# Patient Record
Sex: FEMALE | Race: AMERICAN INDIAN OR ALASKA NATIVE | NOT HISPANIC OR LATINO | Employment: FULL TIME | URBAN - METROPOLITAN AREA
[De-identification: names, ages, dates, MRNs, and addresses within clinical notes are randomized per-mention and may not be internally consistent; named-entity substitution may affect disease eponyms.]

---

## 2017-03-16 ENCOUNTER — OFFICE VISIT (OUTPATIENT)
Dept: FAMILY MEDICINE | Facility: CLINIC | Age: 22
End: 2017-03-16
Payer: COMMERCIAL

## 2017-03-16 VITALS
OXYGEN SATURATION: 99 % | DIASTOLIC BLOOD PRESSURE: 78 MMHG | HEIGHT: 66 IN | TEMPERATURE: 98.2 F | SYSTOLIC BLOOD PRESSURE: 126 MMHG | WEIGHT: 270.3 LBS | HEART RATE: 76 BPM | BODY MASS INDEX: 43.44 KG/M2

## 2017-03-16 DIAGNOSIS — Z11.3 SCREEN FOR STD (SEXUALLY TRANSMITTED DISEASE): ICD-10-CM

## 2017-03-16 DIAGNOSIS — Z30.011 INITIATION OF OCP (BCP): ICD-10-CM

## 2017-03-16 DIAGNOSIS — G93.0 CYST OF BRAIN: Primary | ICD-10-CM

## 2017-03-16 DIAGNOSIS — Z71.85 HPV VACCINE COUNSELING: ICD-10-CM

## 2017-03-16 DIAGNOSIS — S16.1XXA CERVICAL STRAIN, INITIAL ENCOUNTER: ICD-10-CM

## 2017-03-16 PROCEDURE — 87491 CHLMYD TRACH DNA AMP PROBE: CPT | Performed by: FAMILY MEDICINE

## 2017-03-16 PROCEDURE — 87591 N.GONORRHOEAE DNA AMP PROB: CPT | Performed by: FAMILY MEDICINE

## 2017-03-16 PROCEDURE — 99214 OFFICE O/P EST MOD 30 MIN: CPT | Mod: 25 | Performed by: FAMILY MEDICINE

## 2017-03-16 PROCEDURE — 90471 IMMUNIZATION ADMIN: CPT | Performed by: FAMILY MEDICINE

## 2017-03-16 PROCEDURE — 80053 COMPREHEN METABOLIC PANEL: CPT | Performed by: FAMILY MEDICINE

## 2017-03-16 PROCEDURE — 84443 ASSAY THYROID STIM HORMONE: CPT | Performed by: FAMILY MEDICINE

## 2017-03-16 PROCEDURE — 36415 COLL VENOUS BLD VENIPUNCTURE: CPT | Performed by: FAMILY MEDICINE

## 2017-03-16 PROCEDURE — 90649 4VHPV VACCINE 3 DOSE IM: CPT | Performed by: FAMILY MEDICINE

## 2017-03-16 RX ORDER — DESOGESTREL AND ETHINYL ESTRADIOL 0.15-0.03
1 KIT ORAL DAILY
Qty: 84 TABLET | Refills: 3 | Status: SHIPPED | OUTPATIENT
Start: 2017-03-16 | End: 2017-06-08

## 2017-03-16 NOTE — PROGRESS NOTES
"  SUBJECTIVE:                                                    Danielle Do is a 22 year old female who presents to clinic today for the following health issues:      Patient 5 years ago had a MRI done because they found a cyst in her brain at [facility]. They told her to have a follow-up MRI in 5 years. Has been experiencing more frequent headaches. Would also like to start birth control, was on pills before but want to explore options.     Encounter Diagnoses   Name Primary?     Cyst of brain Yes     Initiation of OCP (BCP), wants to go back on OCP      HPV vaccine      Screen for STD (sexually transmitted disease),one partner, usually uses condoms         Problem list and histories reviewed & adjusted, as indicated.  Additional history: as documented    Labs reviewed in EPIC    Reviewed and updated as needed this visit by clinical staff  Tobacco  Allergies  Meds  Med Hx  Surg Hx  Fam Hx  Soc Hx      Reviewed and updated as needed this visit by Provider         ROS:  Constitutional, HEENT, cardiovascular, pulmonary, gi and gu systems are negative, except as otherwise noted.    OBJECTIVE:                                                    /78  Pulse 76  Temp 98.2  F (36.8  C) (Oral)  Ht 5' 6\" (1.676 m)  Wt 270 lb 4.8 oz (122.6 kg)  LMP 03/03/2017 (Exact Date)  SpO2 99%  BMI 43.63 kg/m2  Body mass index is 43.63 kg/(m^2).  GENERAL: healthy, alert and no distress  HENT: ear canals and TM's normal, nose and mouth without ulcers or lesions  NECK: no adenopathy, no asymmetry, masses, or scars and thyroid normal to palpation    tenderness over lower cervical spine and nuchal area, tenderness over trapezial muscles, normal neurological exam of arms; normal DTR's, motor, sensory exam.    RESP: lungs clear to auscultation - no rales, rhonchi or wheezes  CV: regular rate and rhythm, normal S1 S2, no S3 or S4, no murmur, click or rub, no peripheral edema and peripheral pulses strong  ABDOMEN: soft, " nontender, no hepatosplenomegaly, no masses and bowel sounds normal  PSYCH: mentation appears normal, affect normal/bright  LYMPH: no cervical, supraclavicular, axillary, or inguinal adenopathy         ASSESSMENT/PLAN:                                                            1. Cyst of brain  We were unable to assess PK care everywhere, she will resign for records  - MR Brain w/o & w Contrast; Future    2. Initiation of OCP (BCP)  OK The use of the oral contraceptive has been fully discussed with the patient. This includes the proper method to initiate (i.e. Sunday start after next normal menstrual onset) and continue the pills, the need for regular compliance to ensure adequate contraceptive effect, and warnings about anticipated minor side effects such as breakthrough spotting, nausea, breast tenderness, weight changes, acne, headaches, etc.  The patient has been told of the more serious potential side effects such as MI, stroke, and deep vein thrombosis, all of which are very unlikely.  She has been asked to report any signs of such serious problems immediately.  She should back up the pill with a condom during any cycle in which antibiotics are prescribed, and during the first two weeks of the first cycle as well.  The need for additional protection, such as a condom, to prevent exposure to sexually transmitted diseases has also been discussed- the patient has been clearly reminded that OCP's cannot protect her against diseases such as HIV and others. She understands and wishes to take the medication as prescribed.  - Comprehensive metabolic panel (BMP + Alb, Alk Phos, ALT, AST, Total. Bili, TP)  - TSH with free T4 reflexFollow up in 1 month.- desogestrel-ethinyl estradiol (APRI) 0.15-30 MG-MCG per tablet; Take 1 tablet by mouth daily  Dispense: 84 tablet; Refill: 3  Follow up in 1 month.   3. Screen for STD (sexually transmitted disease)  Urged safer sex  - NEISSERIA GONORRHOEA PCR  - CHLAMYDIA TRACHOMATIS  PCR    4. Cervical strain, initial encounter  Try ice    Consider Physical Therapy and XRay studies if not improving.   Call or return to clinic prn if these symptoms worsen or fail to improve as anticipated.    5. HPV vaccine counseling  done  - HUMAN PAPILLOMAVIRUS VACCINE  Regular exercise  Follow up in 1 month.   See Patient Instructions    Cruzito Edouard MD  [facility] [place]

## 2017-03-16 NOTE — PROGRESS NOTES
"Chief Complaint   Patient presents with     Referral     for MRI     Contraception       Initial /78  Pulse 76  Temp 98.2  F (36.8  C) (Oral)  Ht 5' 6\" (1.676 m)  Wt 270 lb 4.8 oz (122.6 kg)  LMP 03/03/2017 (Exact Date)  SpO2 99%  BMI 43.63 kg/m2 Estimated body mass index is 43.63 kg/(m^2) as calculated from the following:    Height as of this encounter: 5' 6\" (1.676 m).    Weight as of this encounter: 270 lb 4.8 oz (122.6 kg).  Medication Reconciliation: complete     Cher Bacon MA        "

## 2017-03-16 NOTE — MR AVS SNAPSHOT
"              After Visit Summary   3/16/2017    Danielle Do    MRN: [id number]           Patient Information     Date Of Birth          1995        Visit Information        Provider Department      3/16/2017 11:00 AM Cruzito Edouard MD [facility] [place]        Today's Diagnoses     Cyst of brain    -  1    Initiation of OCP (BCP)           Follow-ups after your visit        Future tests that were ordered for you today     Open Future Orders        Priority Expected Expires Ordered    MR Brain w/o & w Contrast Routine  3/16/2018 3/16/2017            Who to contact     If you have questions or need follow up information about today's clinic visit or your schedule please contact [facility] directly at [phone].  Normal or non-critical lab and imaging results will be communicated to you by MyChart, letter or phone within 4 business days after the clinic has received the results. If you do not hear from us within 7 days, please contact the clinic through Reunifyhart or phone. If you have a critical or abnormal lab result, we will notify you by phone as soon as possible.  Submit refill requests through ip.access or call your pharmacy and they will forward the refill request to us. Please allow 3 business days for your refill to be completed.          Additional Information About Your Visit        Reunifyhart Information     [email or web address] lets you send messages to your doctor, view your test results, renew your prescriptions, schedule appointments and more. To sign up, go to [email or web address] . Click on \"Log in\" on the left side of the screen, which will take you to the Welcome page. Then click on \"Sign up Now\" on the right side of the page.     You will be asked to enter the access code listed below, as well as some personal information. Please follow the directions to create your username and password.     Your access code is: [email or web address]  Expires: 6/14/2017 11:50 AM     Your " "access code will  in 90 days. If you need help or a new code, please call your [facility] clinic or [phone].        Care EveryWhere ID     This is your Care EveryWhere ID. This could be used by other organizations to access your [facility] medical records  [id number]        Your Vitals Were     Pulse Temperature Height Last Period Pulse Oximetry BMI (Body Mass Index)    76 98.2  F (36.8  C) (Oral) 5' 6\" (1.676 m) 2017 (Exact Date) 99% 43.63 kg/m2       Blood Pressure from Last 3 Encounters:   17 126/78   11 130/86   09 120/80    Weight from Last 3 Encounters:   17 270 lb 4.8 oz (122.6 kg)   09 243 lb 6 oz (110.4 kg) (>99 %)*   09 250 lb (113.4 kg) (>99 %)*     * Growth percentiles are based on CDC 2-20 Years data.              We Performed the Following     Comprehensive metabolic panel (BMP + Alb, Alk Phos, ALT, AST, Total. Bili, TP)     TSH with free T4 reflex          Today's Medication Changes          These changes are accurate as of: 3/16/17 11:50 AM.  If you have any questions, ask your nurse or doctor.               Start taking these medicines.        Dose/Directions    desogestrel-ethinyl estradiol 0.15-30 MG-MCG per tablet   Commonly known as:  APRI   Used for:  Initiation of OCP (BCP)   Started by:  Cruzito Edouard MD        Dose:  1 tablet   Take 1 tablet by mouth daily   Quantity:  84 tablet   Refills:  3            Where to get your medicines      These medications were sent to [facility] [id number] [place] [facility] - [place], [place] - [address]  [address] [address]     Phone:  [phone]     desogestrel-ethinyl estradiol 0.15-30 MG-MCG per tablet                Primary Care Provider    None Specified       No primary provider on file.        Thank you!     Thank you for choosing [facility]  [place] your [place]. Our goal is always to provide you with excellent care. Hearing back from our patients is one way we " can continue to improve our services. Please take a few minutes to complete the written survey that you may receive in the mail after your visit with us. Thank you!             Your Updated Medication List - Protect others around you: Learn how to safely use, store and throw away your medicines at www.disposemymeds.org.          This list is accurate as of: 3/16/17 11:50 AM.  Always use your most recent med list.                   Brand Name Dispense Instructions for use    desogestrel-ethinyl estradiol 0.15-30 MG-MCG per tablet    APRI    84 tablet    Take 1 tablet by mouth daily       TYLENOL 500 MG tablet   Generic drug:  acetaminophen      as directed

## 2017-03-17 LAB
ALBUMIN SERPL-MCNC: 3.8 G/DL (ref 3.4–5)
ALP SERPL-CCNC: 82 U/L (ref 40–150)
ALT SERPL W P-5'-P-CCNC: 46 U/L (ref 0–50)
ANION GAP SERPL CALCULATED.3IONS-SCNC: 8 MMOL/L (ref 3–14)
AST SERPL W P-5'-P-CCNC: 22 U/L (ref 0–45)
BILIRUB SERPL-MCNC: 0.3 MG/DL (ref 0.2–1.3)
BUN SERPL-MCNC: 15 MG/DL (ref 7–30)
C TRACH DNA SPEC QL NAA+PROBE: NORMAL
CALCIUM SERPL-MCNC: 8.5 MG/DL (ref 8.5–10.1)
CHLORIDE SERPL-SCNC: 106 MMOL/L (ref 94–109)
CO2 SERPL-SCNC: 27 MMOL/L (ref 20–32)
CREAT SERPL-MCNC: 0.69 MG/DL (ref 0.52–1.04)
GFR SERPL CREATININE-BSD FRML MDRD: NORMAL ML/MIN/1.7M2
GLUCOSE SERPL-MCNC: 89 MG/DL (ref 70–99)
N GONORRHOEA DNA SPEC QL NAA+PROBE: NORMAL
POTASSIUM SERPL-SCNC: 4 MMOL/L (ref 3.4–5.3)
PROT SERPL-MCNC: 7.4 G/DL (ref 6.8–8.8)
SODIUM SERPL-SCNC: 141 MMOL/L (ref 133–144)
SPECIMEN SOURCE: NORMAL
SPECIMEN SOURCE: NORMAL
TSH SERPL DL<=0.005 MIU/L-ACNC: 0.8 MU/L (ref 0.4–4)

## 2017-06-08 ENCOUNTER — OFFICE VISIT (OUTPATIENT)
Dept: FAMILY MEDICINE | Facility: CLINIC | Age: 22
End: 2017-06-08
Payer: COMMERCIAL

## 2017-06-08 VITALS
OXYGEN SATURATION: 100 % | DIASTOLIC BLOOD PRESSURE: 82 MMHG | HEART RATE: 86 BPM | TEMPERATURE: 98.7 F | SYSTOLIC BLOOD PRESSURE: 132 MMHG | WEIGHT: 264.9 LBS | BODY MASS INDEX: 42.76 KG/M2

## 2017-06-08 DIAGNOSIS — J01.90 ACUTE SINUSITIS WITH SYMPTOMS > 10 DAYS: ICD-10-CM

## 2017-06-08 DIAGNOSIS — R07.0 THROAT PAIN: Primary | ICD-10-CM

## 2017-06-08 DIAGNOSIS — Z30.011 INITIATION OF OCP (BCP): ICD-10-CM

## 2017-06-08 LAB
DEPRECATED S PYO AG THROAT QL EIA: NORMAL
MICRO REPORT STATUS: NORMAL
SPECIMEN SOURCE: NORMAL

## 2017-06-08 PROCEDURE — 87081 CULTURE SCREEN ONLY: CPT | Performed by: FAMILY MEDICINE

## 2017-06-08 PROCEDURE — 87880 STREP A ASSAY W/OPTIC: CPT | Performed by: FAMILY MEDICINE

## 2017-06-08 PROCEDURE — 99213 OFFICE O/P EST LOW 20 MIN: CPT | Performed by: FAMILY MEDICINE

## 2017-06-08 RX ORDER — DESOGESTREL AND ETHINYL ESTRADIOL 0.15-0.03
1 KIT ORAL DAILY
Qty: 84 TABLET | Refills: 3 | Status: SHIPPED | OUTPATIENT
Start: 2017-06-08 | End: 2018-07-10

## 2017-06-08 RX ORDER — AZITHROMYCIN 250 MG/1
TABLET, FILM COATED ORAL
Qty: 6 TABLET | Refills: 0 | Status: SHIPPED | OUTPATIENT
Start: 2017-06-08 | End: 2020-08-20

## 2017-06-08 NOTE — MR AVS SNAPSHOT
"              After Visit Summary   2017    Danielle Do    MRN: [id number]           Patient Information     Date Of Birth          1995        Visit Information        Provider Department      2017 4:30 PM Cruzito Edouard MD [facility]        Today's Diagnoses     Throat pain    -  1    Acute sinusitis with symptoms > 10 days        Initiation of OCP (BCP)           Follow-ups after your visit        Who to contact     If you have questions or need follow up information about today's clinic visit or your schedule please contact [facility] directly at [phone].  Normal or non-critical lab and imaging results will be communicated to you by MyChart, letter or phone within 4 business days after the clinic has received the results. If you do not hear from us within 7 days, please contact the clinic through [email or web address] or phone. If you have a critical or abnormal lab result, we will notify you by phone as soon as possible.  Submit refill requests through IndoorAtlas or call your pharmacy and they will forward the refill request to us. Please allow 3 business days for your refill to be completed.          Additional Information About Your Visit        MyChart Information     IndoorAtlas lets you send messages to your doctor, view your test results, renew your prescriptions, schedule appointments and more. To sign up, go to [email or web address] . Click on \"Log in\" on the left side of the screen, which will take you to the Welcome page. Then click on \"Sign up Now\" on the right side of the page.     You will be asked to enter the access code listed below, as well as some personal information. Please follow the directions to create your username and password.     Your access code is: [email or web address]  Expires: 2017 11:50 AM     Your access code will  in 90 days. If you need help or a new code, please call your [facility] or [phone].        Care " EveryWhere ID     This is your Care EveryWhere ID. This could be used by other organizations to access your [facility] medical records  [id number]        Your Vitals Were     Pulse Temperature Pulse Oximetry BMI (Body Mass Index)          86 98.7  F (37.1  C) (Oral) 100% 42.76 kg/m2         Blood Pressure from Last 3 Encounters:   06/08/17 132/82   03/16/17 126/78   04/18/11 130/86    Weight from Last 3 Encounters:   06/08/17 264 lb 14.4 oz (120.2 kg)   03/16/17 270 lb 4.8 oz (122.6 kg)   08/02/09 243 lb 6 oz (110.4 kg) (>99 %)*     * Growth percentiles are based on CDC 2-20 Years data.              We Performed the Following     Beta strep group A culture     Strep, Rapid Screen          Today's Medication Changes          These changes are accurate as of: 6/8/17  5:11 PM.  If you have any questions, ask your nurse or doctor.               Start taking these medicines.        Dose/Directions    azithromycin 250 MG tablet   Commonly known as:  ZITHROMAX   Used for:  Acute sinusitis with symptoms > 10 days   Started by:  Cruzito Edouard MD        Two tablets first day, then one tablet daily for four days.   Quantity:  6 tablet   Refills:  0            Where to get your medicines      These medications were sent to [facility] [id number] [place], [place] - [address]  [address] [address]     Phone:  [phone]     azithromycin 250 MG tablet    desogestrel-ethinyl estradiol 0.15-30 MG-MCG per tablet                Primary Care Provider    None Specified       No primary provider on file.        Thank you!     Thank you for choosing [facility]  for your care. Our goal is always to provide you with excellent care. Hearing back from our patients is one way we can continue to improve our services. Please take a few minutes to complete the written survey that you may receive in the mail after your visit with us. Thank you!             Your Updated Medication  List - Protect others around you: Learn how to safely use, store and throw away your medicines at www.disposemymeds.org.          This list is accurate as of: 6/8/17  5:11 PM.  Always use your most recent med list.                   Brand Name Dispense Instructions for use    azithromycin 250 MG tablet    ZITHROMAX    6 tablet    Two tablets first day, then one tablet daily for four days.       desogestrel-ethinyl estradiol 0.15-30 MG-MCG per tablet    APRI    84 tablet    Take 1 tablet by mouth daily       TYLENOL 500 MG tablet   Generic drug:  acetaminophen      as directed

## 2017-06-08 NOTE — PROGRESS NOTES
SUBJECTIVE:                                                    Danielle Do is a 22 year old female who presents to clinic today for the following health issues:      Acute Illness   Acute illness concerns: URI    Onset: two weeks    Fever: no     Chills/Sweats: no     Headache (location?): YES    Sinus Pressure:YES    Conjunctivitis:  no    Ear Pain: YES: both irritation    Rhinorrhea: YES- postnasal drip    Congestion: YES    Sore Throat: YES     Cough: YES, productive, yellow/green sputum    Wheeze: YES    Decreased Appetite: YES    Nausea: no    Vomiting: no    Diarrhea:  no    Dysuria/Freq.: no    Fatigue/Achiness: YES    Sick/Strep Exposure: no     Therapies Tried and outcome: Allergy relief medication, helped a little bit

## 2017-06-08 NOTE — PROGRESS NOTES
"Chief Complaint   Patient presents with     URI       Initial /82  Pulse 86  Temp 98.7  F (37.1  C) (Oral)  Wt 264 lb 14.4 oz (120.2 kg)  SpO2 100%  BMI 42.76 kg/m2 Estimated body mass index is 42.76 kg/(m^2) as calculated from the following:    Height as of 3/16/17: 5' 6\" (1.676 m).    Weight as of this encounter: 264 lb 14.4 oz (120.2 kg).  Medication Reconciliation: complete     Chloé Guerra, Student MA     SUBJECTIVE: Danielle is a 22 year old female presenting with aching, facial pain/pressure, fatigue, nasal congestion and nasal discharge greenish and yellowish.  Onset of symptoms was 2 weeks ago.  Course of illness is worsening.  Treatment measures tried include Fluids, OTC meds and Rest.  Predisposing factors include None.    History reviewed. No pertinent past medical history.    Current Outpatient Prescriptions   Medication Sig Dispense Refill     azithromycin (ZITHROMAX) 250 MG tablet Two tablets first day, then one tablet daily for four days. 6 tablet 0     desogestrel-ethinyl estradiol (APRI) 0.15-30 MG-MCG per tablet Take 1 tablet by mouth daily 84 tablet 3     TYLENOL EXTRA STRENGTH 500 MG OR TABS as directed       [DISCONTINUED] desogestrel-ethinyl estradiol (APRI) 0.15-30 MG-MCG per tablet Take 1 tablet by mouth daily 84 tablet 3       OBJECTIVE:  General appearance: alert and in mild distress  Skin color is pink  Hydration status appears adequate with normal skin turgor and moist mucous membranes.    HEENT:   Conjunctiva are not injected without discharge.  Left TM is normal: no effusions, no erythema, and normal landmarks.  Right TM is air/fluid interface.  Nasal mucosa is discharge green and mucoid and inflamed.  Oropharyngeal exam is normal: no lesions, erythema, adenopathy or exudate.  Neck is supple with no adenopathy    CARDIAC:NORMAL - regular rate and rhythm without murmur.  RESP: Normal - Clear to auscultation without rales, rhonchi, or wheezing.     Results for orders placed or " performed in visit on 06/08/17   Strep, Rapid Screen   Result Value Ref Range    Specimen Description Throat     Rapid Strep A Screen       NEGATIVE: No Group A streptococcal antigen detected by immunoassay, await   culture report.      Micro Report Status FINAL 06/08/2017         ASSESSMENT:  Encounter Diagnoses   Name Primary?     Throat pain Yes     Acute sinusitis with symptoms > 10 days      Initiation of OCP (BCP)         PLAN:  Rx   Orders Placed This Encounter     TDAP VACCINE (ADACEL)     azithromycin (ZITHROMAX) 250 MG tablet     Sig: Two tablets first day, then one tablet daily for four days.     Dispense:  6 tablet     Refill:  0     desogestrel-ethinyl estradiol (APRI) 0.15-30 MG-MCG per tablet     Sig: Take 1 tablet by mouth daily     Dispense:  84 tablet     Refill:  3      and saline nasal spray  Follow up only if unimproved.

## 2017-06-09 LAB
BACTERIA SPEC CULT: NORMAL
MICRO REPORT STATUS: NORMAL
SPECIMEN SOURCE: NORMAL

## 2018-07-09 NOTE — PROGRESS NOTES
SUBJECTIVE:   CC: Danielle Do is an 23 year old woman who presents for preventive health visit.     Healthy Habits:    Do you get at least three servings of calcium containing foods daily (dairy, green leafy vegetables, etc.)? yes    Amount of exercise or daily activities, outside of work: 2-3 day(s) per week    Problems taking medications regularly No    Medication side effects: No    Have you had an eye exam in the past two years? yes    Do you see a dentist twice per year? yes    Do you have sleep apnea, excessive snoring or daytime drowsiness?no      Doing well on OCp, menses lighter, more rewgular, less painful' sexually active with one male partner, uses condoms    Today's PHQ-2 Score:   PHQ-2 ( 1999 Pfizer) 7/10/2018 3/16/2017   Q1: Little interest or pleasure in doing things 0 0   Q2: Feeling down, depressed or hopeless 0 0   PHQ-2 Score 0 0       Abuse: Current or Past(Physical, Sexual or Emotional)- No  Do you feel safe in your environment - Yes    Social History   Substance Use Topics     Smoking status: Never Smoker     Smokeless tobacco: Never Used     Alcohol use Yes      Comment: 2 per week      If you drink alcohol do you typically have >3 drinks per day or >7 drinks per week? No                     Reviewed orders with patient.  Reviewed health maintenance and updated orders accordingly - Yes  Labs reviewed in EPIC    Mammogram not appropriate for this patient based on age.    Pertinent mammograms are reviewed under the imaging tab.  History of abnormal Pap smear: NO - age 21-29 PAP every 3 years recommended     Reviewed and updated as needed this visit by clinical staff  Tobacco  Allergies  Meds  Med Hx  Surg Hx  Fam Hx  Soc Hx        Reviewed and updated as needed this visit by Provider        History reviewed. No pertinent past medical history.     ROS:  CONSTITUTIONAL: NEGATIVE for fever, chills, change in weight  INTEGUMENTARU/SKIN: NEGATIVE for worrisome rashes, moles or  "lesions  EYES: NEGATIVE for vision changes or irritation  ENT: NEGATIVE for ear, mouth and throat problems  RESP: NEGATIVE for significant cough or SOB  BREAST: NEGATIVE for masses, tenderness or discharge  CV: NEGATIVE for chest pain, palpitations or peripheral edema  GI: NEGATIVE for nausea, abdominal pain, heartburn, or change in bowel habits  : NEGATIVE for unusual urinary or vaginal symptoms. Periods are regular.  MUSCULOSKELETAL: NEGATIVE for significant arthralgias or myalgia  NEURO: NEGATIVE for weakness, dizziness or paresthesias  PSYCHIATRIC: NEGATIVE for changes in mood or affect    OBJECTIVE:   /80  Pulse 73  Temp 98.8  F (37.1  C) (Oral)  Ht 5' 6.53\" (1.69 m)  Wt 269 lb 1.6 oz (122.1 kg)  SpO2 98%  BMI 42.74 kg/m2  EXAM:  GENERAL: healthy, alert and no distress  EYES: Eyes grossly normal to inspection, PERRL and conjunctivae and sclerae normal  HENT: ear canals and TM's normal, nose and mouth without ulcers or lesions  NECK: no adenopathy, no asymmetry, masses, or scars and thyroid normal to palpation  RESP: lungs clear to auscultation - no rales, rhonchi or wheezes  BREAST: normal without masses, tenderness or nipple discharge and no palpable axillary masses or adenopathy  CV: regular rate and rhythm, normal S1 S2, no S3 or S4, no murmur, click or rub, no peripheral edema and peripheral pulses strong  ABDOMEN: soft, nontender, no hepatosplenomegaly, no masses and bowel sounds normal   (female): normal female external genitalia, normal urethral meatus, vaginal mucosa, normal cervix/adnexa/uterus without masses or discharge  MS: no gross musculoskeletal defects noted, no edema  SKIN: no suspicious lesions or rashes  NEURO: Normal strength and tone, mentation intact and speech normal  PSYCH: mentation appears normal, affect normal/bright  LYMPH: no cervical, supraclavicular, axillary, or inguinal adenopathy    Diagnostic Test Results:  Results for orders placed or performed in visit on " "07/10/18   CBC with platelets   Result Value Ref Range    WBC 8.0 4.0 - 11.0 10e9/L    RBC Count 4.63 3.8 - 5.2 10e12/L    Hemoglobin 12.9 11.7 - 15.7 g/dL    Hematocrit 38.1 35.0 - 47.0 %    MCV 82 78 - 100 fl    MCH 27.9 26.5 - 33.0 pg    MCHC 33.9 31.5 - 36.5 g/dL    RDW 12.9 10.0 - 15.0 %    Platelet Count 254 150 - 450 10e9/L       ASSESSMENT/PLAN:   1. Routine general medical examination at a health care facility  Doing well  - CBC with platelets  - TSH with free T4 reflex    2. Screening examination for venereal disease  Low risk  - NEISSERIA GONORRHOEA PCR  - CHLAMYDIA TRACHOMATIS PCR    3. Screening for HIV (human immunodeficiency virus)  Low risk  - HIV Screening    4. Need for prophylactic vaccination with tetanus-diphtheria (TD)  done  - TDAP VACCINE (ADACEL)  - ADMIN 1st VACCINE    5. Screening for malignant neoplasm of cervix  sent  - Pap imaged thin layer screen only - recommended age 21 - 24 years    6. Initiation of OCP (BCP)  continue  - desogestrel-ethinyl estradiol (APRI) 0.15-30 MG-MCG per tablet; Take 1 tablet by mouth daily  Dispense: 84 tablet; Refill: 3    7. Morbid obesity (H)  reviewe ddiet/ exercise      COUNSELING:   Reviewed preventive health counseling, as reflected in patient instructions       Regular exercise       Healthy diet/nutrition       Vision screening       Hearing screening       Immunizations    Vaccinated for: TDAP             Contraception       Family planning       Safe sex practices/STD prevention       HIV screeninx in teen years, 1x in adult years, and at intervals if high risk    BP Readings from Last 1 Encounters:   07/10/18 132/80     Estimated body mass index is 42.74 kg/(m^2) as calculated from the following:    Height as of this encounter: 5' 6.53\" (1.69 m).    Weight as of this encounter: 269 lb 1.6 oz (122.1 kg).      Weight management plan: Discussed healthy diet and exercise guidelines and patient will follow up in 12 months in clinic to " re-evaluate.     reports that she has never smoked. She has never used smokeless tobacco.      Counseling Resources:  ATP IV Guidelines  Pooled Cohorts Equation Calculator  Breast Cancer Risk Calculator  FRAX Risk Assessment  ICSI Preventive Guidelines  Dietary Guidelines for Americans, 2010  USDA's MyPlate  ASA Prophylaxis  Lung CA Screening    Cruzito Edouard MD  [facility] [place]

## 2018-07-10 ENCOUNTER — OFFICE VISIT (OUTPATIENT)
Dept: FAMILY MEDICINE | Facility: CLINIC | Age: 23
End: 2018-07-10
Payer: COMMERCIAL

## 2018-07-10 VITALS
TEMPERATURE: 98.8 F | HEIGHT: 67 IN | BODY MASS INDEX: 42.24 KG/M2 | HEART RATE: 73 BPM | SYSTOLIC BLOOD PRESSURE: 132 MMHG | OXYGEN SATURATION: 98 % | DIASTOLIC BLOOD PRESSURE: 80 MMHG | WEIGHT: 269.1 LBS

## 2018-07-10 DIAGNOSIS — Z30.011 INITIATION OF OCP (BCP): ICD-10-CM

## 2018-07-10 DIAGNOSIS — Z23 NEED FOR PROPHYLACTIC VACCINATION WITH TETANUS-DIPHTHERIA (TD): ICD-10-CM

## 2018-07-10 DIAGNOSIS — Z12.4 SCREENING FOR MALIGNANT NEOPLASM OF CERVIX: ICD-10-CM

## 2018-07-10 DIAGNOSIS — Z00.00 ROUTINE GENERAL MEDICAL EXAMINATION AT A HEALTH CARE FACILITY: Primary | ICD-10-CM

## 2018-07-10 DIAGNOSIS — Z11.3 SCREENING EXAMINATION FOR VENEREAL DISEASE: ICD-10-CM

## 2018-07-10 DIAGNOSIS — E66.01 MORBID OBESITY (H): ICD-10-CM

## 2018-07-10 DIAGNOSIS — Z11.4 SCREENING FOR HIV (HUMAN IMMUNODEFICIENCY VIRUS): ICD-10-CM

## 2018-07-10 LAB
ERYTHROCYTE [DISTWIDTH] IN BLOOD BY AUTOMATED COUNT: 12.9 % (ref 10–15)
HCT VFR BLD AUTO: 38.1 % (ref 35–47)
HGB BLD-MCNC: 12.9 G/DL (ref 11.7–15.7)
HIV 1+2 AB+HIV1 P24 AG SERPL QL IA: NONREACTIVE
MCH RBC QN AUTO: 27.9 PG (ref 26.5–33)
MCHC RBC AUTO-ENTMCNC: 33.9 G/DL (ref 31.5–36.5)
MCV RBC AUTO: 82 FL (ref 78–100)
PLATELET # BLD AUTO: 254 10E9/L (ref 150–450)
RBC # BLD AUTO: 4.63 10E12/L (ref 3.8–5.2)
TSH SERPL DL<=0.005 MIU/L-ACNC: 1.44 MU/L (ref 0.4–4)
WBC # BLD AUTO: 8 10E9/L (ref 4–11)

## 2018-07-10 PROCEDURE — 87591 N.GONORRHOEAE DNA AMP PROB: CPT | Performed by: FAMILY MEDICINE

## 2018-07-10 PROCEDURE — 36415 COLL VENOUS BLD VENIPUNCTURE: CPT | Performed by: FAMILY MEDICINE

## 2018-07-10 PROCEDURE — 85027 COMPLETE CBC AUTOMATED: CPT | Performed by: FAMILY MEDICINE

## 2018-07-10 PROCEDURE — 84443 ASSAY THYROID STIM HORMONE: CPT | Performed by: FAMILY MEDICINE

## 2018-07-10 PROCEDURE — 87491 CHLMYD TRACH DNA AMP PROBE: CPT | Performed by: FAMILY MEDICINE

## 2018-07-10 PROCEDURE — 87389 HIV-1 AG W/HIV-1&-2 AB AG IA: CPT | Performed by: FAMILY MEDICINE

## 2018-07-10 PROCEDURE — 90715 TDAP VACCINE 7 YRS/> IM: CPT | Performed by: FAMILY MEDICINE

## 2018-07-10 PROCEDURE — 99395 PREV VISIT EST AGE 18-39: CPT | Mod: 25 | Performed by: FAMILY MEDICINE

## 2018-07-10 PROCEDURE — G0145 SCR C/V CYTO,THINLAYER,RESCR: HCPCS | Performed by: FAMILY MEDICINE

## 2018-07-10 PROCEDURE — 90471 IMMUNIZATION ADMIN: CPT | Performed by: FAMILY MEDICINE

## 2018-07-10 RX ORDER — DESOGESTREL AND ETHINYL ESTRADIOL 0.15-0.03
1 KIT ORAL DAILY
Qty: 84 TABLET | Refills: 3 | Status: SHIPPED | OUTPATIENT
Start: 2018-07-10 | End: 2019-06-15

## 2018-07-10 NOTE — LETTER
July 13, 2018      Danielle Do  [address]  [address]  [facility] [address]    Dear ,      I am happy to inform you that your recent cervical cancer screening test (PAP smear) was normal.      Preventative screenings such as this help to ensure your health for years to come. You should repeat a pap smear in 3 years, unless otherwise directed.      You will still need to return to the clinic every year for your annual exam and other preventive tests.     Please contact the clinic at [phone] if you have further questions.       Sincerely,      Cruzito Edouard MD/[facility]

## 2018-07-10 NOTE — MR AVS SNAPSHOT
After Visit Summary   7/10/2018    Danielle Do    MRN: [id number]           Patient Information     Date Of Birth          1995        Visit Information        Provider Department      7/10/2018 11:30 AM Cruzito Edouard MD [facility] [place]        Today's Diagnoses     Routine general medical examination at a health care facility    -  1    Screening examination for venereal disease        Screening for HIV (human immunodeficiency virus)        Need for prophylactic vaccination with tetanus-diphtheria (TD)        Screening for malignant neoplasm of cervix        Initiation of OCP (BCP)        Morbid obesity (H)          Care Instructions      Preventive Health Recommendations  Female Ages 21 to 25     Yearly exam:     See your health care provider every year in order to  o Review health changes.   o Discuss preventive care.    o Review your medicines if your doctor has prescribed any.      You should be tested each year for STDs (sexually transmitted diseases).       Talk to your provider about how often you should have cholesterol testing.      Get a Pap test every three years. If you have an abnormal result, your doctor may have you test more often.      If you are at risk for diabetes, you should have a diabetes test (fasting glucose).     Shots:     Get a flu shot each year.     Get a tetanus shot every 10 years.     Consider getting the shot (vaccine) that prevents cervical cancer (Gardasil).    Nutrition:     Eat at least 5 servings of fruits and vegetables each day.    Eat whole-grain bread, whole-wheat pasta and brown rice instead of white grains and rice.    Get adequate Calcium and Vitamin D.     Lifestyle    Exercise at least 150 minutes a week each week (30 minutes a day, 5 days a week). This will help you control your weight and prevent disease.    Limit alcohol to one drink per day.    No smoking.     Wear sunscreen to prevent skin cancer.    See your  "dentist every six months for an exam and cleaning.          Follow-ups after your visit        Who to contact     If you have questions or need follow up information about today's clinic visit or your schedule please contact [facility] directly at [phone].  Normal or non-critical lab and imaging results will be communicated to you by MyChart, letter or phone within 4 business days after the clinic has received the results. If you do not hear from us within 7 days, please contact the clinic through MyChart or phone. If you have a critical or abnormal lab result, we will notify you by phone as soon as possible.  Submit refill requests through Selatra or call your pharmacy and they will forward the refill request to us. Please allow 3 business days for your refill to be completed.          Additional Information About Your Visit        MyCharTwist Information     Selatra lets you send messages to your doctor, view your test results, renew your prescriptions, schedule appointments and more. To sign up, go to [email or web address] . Click on \"Log in\" on the left side of the screen, which will take you to the Welcome page. Then click on \"Sign up Now\" on the right side of the page.     You will be asked to enter the access code listed below, as well as some personal information. Please follow the directions to create your username and password.     Your access code is: [email or web address]  Expires: 10/8/2018  1:29 PM     Your access code will  in 90 days. If you need help or a new code, please call your [facility] or [phone].        Care EveryWhere ID     This is your Care EveryWhere ID. This could be used by other organizations to access your [facility] medical records  [id number]        Your Vitals Were     Pulse Temperature Height Pulse Oximetry BMI (Body Mass Index)       73 98.8  F (37.1  C) (Oral) 5' 6.53\" (1.69 m) 98% 42.74 kg/m2        Blood Pressure from Last 3 Encounters:   07/10/18 " 132/80   06/08/17 132/82   03/16/17 126/78    Weight from Last 3 Encounters:   07/10/18 269 lb 1.6 oz (122.1 kg)   06/08/17 264 lb 14.4 oz (120.2 kg)   03/16/17 270 lb 4.8 oz (122.6 kg)              We Performed the Following     ADMIN 1st VACCINE     CBC with platelets     CHLAMYDIA TRACHOMATIS PCR     HIV Screening     NEISSERIA GONORRHOEA PCR     Pap imaged thin layer screen only - recommended age 21 - 24 years     TDAP VACCINE (ADACEL)     TSH with free T4 reflex          Where to get your medicines      These medications were sent to [facility] [address] [facility] - [place], [place] - [address]  [address] [facility] [address]     Phone:  [phone]     desogestrel-ethinyl estradiol 0.15-30 MG-MCG per tablet          Primary Care Provider Office Phone # Fax #    [facility] [phone]       [address] [facility] [address]  [facility] [address]        Equal Access to Services     ROBERT WOLFF : Hadii shanta ku hadasho Soomaali, waaxda luqadaha, qaybta kaalmada adeegyada, waxay emilin haymatt lindquist . So [facility] [phone].    ATENCIÓN: Si habla español, tiene a medina disposición servicios gratuitos de asistencia lingüística. Llame al [phone].    We comply with applicable federal civil rights laws and [place] laws. We do not discriminate on the basis of race, color, national origin, age, disability, sex, sexual orientation, or gender identity.            Thank you!     Thank you for choosing [facility]  for your care. Our goal is always to provide you with excellent care. Hearing back from our patients is one way we can continue to improve our services. Please take a few minutes to complete the written survey that you may receive in the mail after your visit with us. Thank you!             Your Updated Medication List - Protect others around you: Learn how to safely use, store and throw away your medicines at www.disposemymeds.org.          This  list is accurate as of 7/10/18  1:29 PM.  Always use your most recent med list.                   Brand Name Dispense Instructions for use Diagnosis    azithromycin 250 MG tablet    ZITHROMAX    6 tablet    Two tablets first day, then one tablet daily for four days.    Acute sinusitis with symptoms > 10 days       desogestrel-ethinyl estradiol 0.15-30 MG-MCG per tablet    APRI    84 tablet    Take 1 tablet by mouth daily    Initiation of OCP (BCP)       TYLENOL 500 MG tablet   Generic drug:  acetaminophen      as directed

## 2018-07-11 LAB
C TRACH DNA SPEC QL NAA+PROBE: NEGATIVE
N GONORRHOEA DNA SPEC QL NAA+PROBE: NEGATIVE
SPECIMEN SOURCE: NORMAL
SPECIMEN SOURCE: NORMAL

## 2018-07-12 LAB
COPATH REPORT: NORMAL
PAP: NORMAL

## 2019-01-28 ENCOUNTER — OFFICE VISIT (OUTPATIENT)
Dept: FAMILY MEDICINE | Facility: CLINIC | Age: 24
End: 2019-01-28
Payer: COMMERCIAL

## 2019-01-28 VITALS
TEMPERATURE: 97.4 F | SYSTOLIC BLOOD PRESSURE: 138 MMHG | BODY MASS INDEX: 42.77 KG/M2 | HEART RATE: 71 BPM | OXYGEN SATURATION: 100 % | WEIGHT: 272.5 LBS | HEIGHT: 67 IN | RESPIRATION RATE: 12 BRPM | DIASTOLIC BLOOD PRESSURE: 88 MMHG

## 2019-01-28 DIAGNOSIS — B35.4 TINEA CORPORIS: ICD-10-CM

## 2019-01-28 DIAGNOSIS — E66.01 MORBID OBESITY (H): ICD-10-CM

## 2019-01-28 DIAGNOSIS — M54.6 ACUTE RIGHT-SIDED THORACIC BACK PAIN: Primary | ICD-10-CM

## 2019-01-28 PROCEDURE — 99214 OFFICE O/P EST MOD 30 MIN: CPT | Performed by: FAMILY MEDICINE

## 2019-01-28 RX ORDER — CYCLOBENZAPRINE HCL 10 MG
10 TABLET ORAL
Qty: 30 TABLET | Refills: 1 | Status: SHIPPED | OUTPATIENT
Start: 2019-01-28 | End: 2019-02-07

## 2019-01-28 ASSESSMENT — MIFFLIN-ST. JEOR: SCORE: 2018.8

## 2019-01-28 ASSESSMENT — PAIN SCALES - GENERAL: PAINLEVEL: MILD PAIN (3)

## 2019-01-28 NOTE — PROGRESS NOTES
SUBJECTIVE:   Danielle Do is a 23 year old female who presents to clinic today for the following health issues:      Back Pain       Duration: 3-4 weeks         Specific cause: none    Description:   Location of pain: upper back mostly on right  Character of pain: dull ache and constant  Pain radiation:none  New numbness or weakness in legs, not attributed to pain:  no     Intensity: Currently 3/10, At its worst 7 or 8/10    History:   Pain interferes with job: YES  History of back problems: no prior back problems  Any previous MRI or X-rays: None  Sees a specialist for back pain:  No  Therapies tried without relief: acetaminophen (Tylenol) and NSAIDs, heating pad help temporarily and has used icy hot     Alleviating factors:   Improved by: none      Precipitating factors:  Worsened by: Sitting    Back Pain  Patient has been having pain in her upper back, more so on the right, for the last 3-4 weeks. She has not had back pain in the past. She has been sitting at a desk for 8 hours after she recently started a new job, she believes this could be contributing. Her pain is causing her to lose sleep. She has tried using a heating pad, used Icy Hot, and has taken Ibuprofen 400 mg twice daily, none of which have been particularly helpful.    Rash  Patient has a rash on her upper thigh that she is concerned about.      Problem list and histories reviewed & adjusted, as indicated.  Additional history: as documented    Patient Active Problem List   Diagnosis     Morbid obesity (H)     History reviewed. No pertinent surgical history.    Social History     Tobacco Use     Smoking status: Never Smoker     Smokeless tobacco: Never Used   Substance Use Topics     Alcohol use: Yes     Comment: 2 per week      History reviewed. No pertinent family history.      Current Outpatient Medications   Medication Sig Dispense Refill     desogestrel-ethinyl estradiol (APRI) 0.15-30 MG-MCG per tablet Take 1 tablet by mouth daily 84  "tablet 3     TYLENOL EXTRA STRENGTH 500 MG OR TABS as directed       azithromycin (ZITHROMAX) 250 MG tablet Two tablets first day, then one tablet daily for four days. (Patient not taking: Reported on 1/28/2019) 6 tablet 0     Allergies   Allergen Reactions     Amoxicillin      BP Readings from Last 3 Encounters:   01/28/19 138/88   07/10/18 132/80   06/08/17 132/82    Wt Readings from Last 3 Encounters:   01/28/19 123.6 kg (272 lb 8 oz)   07/10/18 122.1 kg (269 lb 1.6 oz)   06/08/17 120.2 kg (264 lb 14.4 oz)                  Labs reviewed in EPIC    Reviewed and updated as needed this visit by clinical staff       Reviewed and updated as needed this visit by Provider         ROS:  Remainder of ROS is negative unless otherwise noted above or in HPI.    This document serves as a record of the services and decisions personally performed and made by Rajeev Ritter MD. It was created on his behalf by Rashid Head, trained medical scribe. The creation of this document is based on the provider's statements to the medical scribe.  Rashid Head 3:08 PM January 28, 2019    OBJECTIVE:     /88   Pulse 71   Temp 97.4  F (36.3  C) (Temporal)   Resp 12   Ht 1.702 m (5' 7.01\")   Wt 123.6 kg (272 lb 8 oz)   LMP 01/07/2019 (Approximate)   SpO2 100%   Breastfeeding? No   BMI 42.67 kg/m    Body mass index is 42.67 kg/m .  GENERAL: healthy, alert and no distress  MS: no gross musculoskeletal defects noted, no edema, flattening of the lumbar curve, spinous processes nontender, SI joint slightly tender, pain is under the tip of the right shoulder blade  SKIN: no suspicious lesions, approximately 1.5x3.5 inch papulosquamous rash with central clearning on the upper inner right thigh, other reported patches on the left thigh  NEURO: Normal strength and tone, mentation intact and speech normal  PSYCH: mentation appears normal, affect normal/bright    Diagnostic Test Results:  none     ASSESSMENT/PLAN:   (M54.6) Acute " right-sided thoracic back pain  (primary encounter diagnosis)  Comment: Referred to PT.  Plan: [facility]OP[facility] REFERRAL,         cyclobenzaprine (FLEXERIL) 10 MG tablet        Follow up with referral.    (B35.4) Tinea corporis  Comment: Uncontrolled.  Plan: Advised patient to use Miconazole and Benadryl cream. Follow up as needed.      (E66.01) Morbid obesity (H)  Comment: needs plan  Plan: consider lifestyles management referral after PT for back.        Patient Instructions   Take Ibuprofen 200-400 mg with breakfast, lunch, and dinner, for at least two weeks. Use Miconazole and Benadryl cream over the affected area. Make sure your skin is wet before creams are applied.        The information in this document, created by the medical scribe for me, accurately reflects the services I personally performed and the decisions made by me. I have reviewed and approved this document for accuracy prior to leaving the patient care area.  January 28, 2019 3:08 PM    Rajeev Ritter MD  [facility]

## 2019-01-28 NOTE — PATIENT INSTRUCTIONS
Take Ibuprofen 200-400 mg with breakfast, lunch, and dinner, for at least two weeks. Use Miconazole and Benadryl cream over the affected area. Make sure your skin is wet before creams are applied.

## 2019-02-11 ENCOUNTER — THERAPY VISIT (OUTPATIENT)
Dept: PHYSICAL THERAPY | Facility: CLINIC | Age: 24
End: 2019-02-11
Payer: COMMERCIAL

## 2019-02-11 DIAGNOSIS — M54.6 ACUTE RIGHT-SIDED THORACIC BACK PAIN: ICD-10-CM

## 2019-02-11 PROCEDURE — 97112 NEUROMUSCULAR REEDUCATION: CPT | Mod: GP | Performed by: PHYSICAL THERAPIST

## 2019-02-11 PROCEDURE — 97161 PT EVAL LOW COMPLEX 20 MIN: CPT | Mod: GP | Performed by: PHYSICAL THERAPIST

## 2019-02-11 PROCEDURE — 97110 THERAPEUTIC EXERCISES: CPT | Mod: GP | Performed by: PHYSICAL THERAPIST

## 2019-02-11 NOTE — PROGRESS NOTES
[facility] Initial Evaluation  Subjective:  The history is provided by the patient. No  was used.   Danielle Do is a 23 year old female with a thoracic condition.  Condition occurred with:  Insidious onset.  Condition occurred: for unknown reasons.  This is a new condition  On or about 1/4/2019, no specific injury or accident, I started to have mid back pain. I did take a new job and have to do more sitting.  .    Patient reports pain:  Mid thoracic spine and thoracic spine right.  Radiates to: mid back   Quality: N/T, dull. and is constant and reported as 3/10 and 6/10 ([facility]).  Associated symptoms:  Numbness and tingling. Pain is worse in the P.M..  Symptoms are exacerbated by sitting, lifting and carrying and relieved by muscle relaxants and heat (IB).  Since onset symptoms are gradually improving.  Special testing: none.  Previous treatment: none.    General health as reported by patient is good.  Pertinent medical history includes:  High blood pressure and overweight.  Medical allergies: no.  Other surgeries include:  No.  Current medications:  Muscle relaxants and anti-inflammatory.  Current occupation is .  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  Employment tasks: sitting, computer.    Barriers: apartment, 3rd floor without elevator.    Red flags:  None as reported by the patient.                        Objective:  Standing Alignment:    Cervical/Thoracic:  Forward head (sitting posture fair)  Shoulder/UE:  Rounded shoulders (internal rotation of bilateral shoulders)  Lumbar:  Lordosis incr                Flexibility/Screens:   Positive screens:  Thoracic  Upper Extremity:    Decreased left upper extremity flexibility at:  Pectoralis Major and Pectoralis Minor    Decreased right upper extremity flexibility present at:  Pectoralis Major and Pectoralis Minor                      Cervical/Thoracic  Evaluation    AROM:  AROM Cervical:    Flexion:          WFL pulling in the mid back   Extension:       WFL  Rotation:         Left: 80% of motion     Right: WFL  Side Bend:      Left:     Right:   AROM Thoracic:    Flexion:              Extension:           Rotation:            Left: moderate, painful      Right: 75% of motion    Strength: shoulder flexion, abduction WFL. , reaching to opposite shoulder soreness. TROM flexion WFL             Cervical Palpation:  : thoracic erector spinal   Tenderness present at Left:    Rhomboids and Facet  Tenderness present at Right:    Rhomboids and Facet    Cervical Stability/Joint Clearing:    Left positive at:Provocation and Mobility  Right positive at:  Provocation and Mobility  Spinal Segmental Conclusions:    Level:  Hypo at T3, T4, T5, T6, T7, T8 and T9                                                General     ROS    Assessment/Plan:    Patient is a 23 year old female with thoracic complaints.    Patient has the following significant findings with corresponding treatment plan.                Diagnosis 1:  Right mid back pain  Pain -  manual therapy, self management, education and home program  Decreased ROM/flexibility - manual therapy, therapeutic exercise, therapeutic activity and home program  Decreased joint mobility - manual therapy, therapeutic exercise, therapeutic activity and home program  Impaired muscle performance - neuro re-education and home program  Decreased function - therapeutic activities and home program  Impaired posture - neuro re-education, therapeutic activities and home program    Therapy Evaluation Codes:   1) History comprised of:   Personal factors that impact the plan of care:      Profession and computer sitting.    Comorbidity factors that impact the plan of care are:      High blood pressure and Overweight.     Medications impacting care: Muscle relaxant.  2) Examination of Body Systems comprised of:   Body structures and functions that  impact the plan of care:      Thoracic Spine.   Activity limitations that impact the plan of care are:      Bending, Driving, Lifting, Reading/Computer work, Sitting, Working and Sleeping.  3) Clinical presentation characteristics are:   Stable/Uncomplicated.  4) Decision-Making    Low complexity using standardized patient assessment instrument and/or measureable assessment of functional outcome.  Cumulative Therapy Evaluation is: Low complexity.    Previous and current functional limitations:  (See Goal Flow Sheet for this information)    Short term and Long term goals: (See Goal Flow Sheet for this information)     Communication ability:  Patient appears to be able to clearly communicate and understand verbal and written communication and follow directions correctly.  Treatment Explanation - The following has been discussed with the patient:   RX ordered/plan of care  Possible risks and side effects  This patient would benefit from PT intervention to resume normal activities.   Rehab potential is good.    Frequency:  1 X week, once daily  Duration:  for 6 weeks  Discharge Plan:  Achieve all LTG.  Independent in home treatment program.    Please refer to the daily flowsheet for treatment today, total treatment time and time spent performing 1:1 timed codes.

## 2019-02-11 NOTE — LETTER
[facility]THLETIC [facility] PHYSICAL THERAPY  [address]  [facility] [address]  [phone]    2019    Re: Danielle Do   :   1995  MRN:  [id number]   REFERRING PHYSICIAN:   Rajeev Ritter    [facility]    Date of Initial Evaluation:  2019  Visits:  Rxs Used: 1  Reason for Referral:  Acute right-sided thoracic back pain    EVALUATION SUMMARY    Subjective:  The history is provided by the patient. No  was used.   Danielle Do is a 23 year old female with a thoracic condition.  Condition occurred with:  Insidious onset.  Condition occurred: for unknown reasons.  This is a new condition  On or about 2019, no specific injury or accident, I started to have mid back pain. I did take a new job and have to do more sitting.  .    Patient reports pain:  Mid thoracic spine and thoracic spine right.  Radiates to: mid back  Quality: N/T, dull. and is constant and reported as 3/10 and 6/10 (ranges).  Associated symptoms:  Numbness and tingling. Pain is worse in the P.M..  Symptoms are exacerbated by sitting, lifting and carrying and relieved by muscle relaxants and heat (IB).  Since onset symptoms are gradually improving.  Special testing: none.  Previous treatment: none.    General health as reported by patient is good.  Pertinent medical history includes:  High blood pressure and overweight.  Medical allergies: no.  Other surgeries include:  No.  Current medications:  Muscle relaxants and anti-inflammatory.  Current occupation is .  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  Employment tasks: sitting, computer.  Barriers: apartment, 3rd floor without elevator.  Red flags:  None as reported by the patient.              Objective:  Standing Alignment:    Cervical/Thoracic:  Forward head (sitting posture  fair)  Shoulder/UE:  Rounded shoulders (internal rotation of bilateral shoulders)  Lumbar:  Lordosis incr  Flexibility/Screens:   Positive screens:  Thoracic  Upper Extremity:    Decreased left upper extremity flexibility at:  Pectoralis Major and Pectoralis Minor  Decreased right upper extremity flexibility present at:  Pectoralis Major and Pectoralis Minor    Cervical/Thoracic Evaluation  AROM:  Re: Danielle RAPP Hi   :   1995    AROM Cervical:  Flexion:          WFL pulling in the mid back   Extension:       WFL  Rotation:         Left: 80% of motion     Right: WFL  Side Bend:      Left:     Right:   AROM Thoracic:  Flexion:              Extension:           Rotation:            Left: moderate, painful      Right: 75% of motion    Strength: shoulder flexion, abduction WFL. , reaching to opposite shoulder soreness. TROM flexion WFL   Cervical Palpation:  : thoracic erector spinal   Tenderness present at Left:    Rhomboids and Facet  Tenderness present at Right:    Rhomboids and Facet  Cervical Stability/Joint Clearing:    Left positive at:Provocation and Mobility  Right positive at:  Provocation and Mobility  Spinal Segmental Conclusions:    Level:  Hypo at T3, T4, T5, T6, T7, T8 and T9    Assessment/Plan:    Patient is a 23 year old female with thoracic complaints.    Patient has the following significant findings with corresponding treatment plan.                Diagnosis 1:  Right mid back pain  Pain -  manual therapy, self management, education and home program  Decreased ROM/flexibility - manual therapy, therapeutic exercise, therapeutic activity and home program  Decreased joint mobility - manual therapy, therapeutic exercise, therapeutic activity and home program  Impaired muscle performance - neuro re-education and home program  Decreased function - therapeutic activities and home program  Impaired posture - neuro re-education, therapeutic activities and home program    Therapy Evaluation Codes:    1) History comprised of:   Personal factors that impact the plan of care:      Profession and computer sitting.    Comorbidity factors that impact the plan of care are:      High blood pressure and Overweight.     Medications impacting care: Muscle relaxant.  2) Examination of Body Systems comprised of:   Body structures and functions that impact the plan of care:      Thoracic Spine.   Activity limitations that impact the plan of care are:      Bending, Driving, Lifting, Reading/Computer work, Sitting, Working and Sleeping.  3) Clinical presentation characteristics are:   Stable/Uncomplicated.  4) Decision-Making    Re: Danielle Do   :   1995      Low complexity using standardized patient assessment instrument and/or measureable assessment of functional outcome.  Cumulative Therapy Evaluation is: Low complexity.    Previous and current functional limitations:  (See Goal Flow Sheet for this information)    Short term and Long term goals: (See Goal Flow Sheet for this information)     Communication ability:  Patient appears to be able to clearly communicate and understand verbal and written communication and follow directions correctly.  Treatment Explanation - The following has been discussed with the patient:   RX ordered/plan of care  Possible risks and side effects  This patient would benefit from PT intervention to resume normal activities.   Rehab potential is good.    Frequency:  1 X week, once daily  Duration:  for 6 weeks  Discharge Plan:  Achieve all LTG.  Independent in home treatment program.    Thank you for your referral.    INQUIRIES  Therapist: Joanne Dixon, PT   [facility]  [address] [address]  Phone: [phone]  Fax: [phone]

## 2019-02-18 ENCOUNTER — THERAPY VISIT (OUTPATIENT)
Dept: PHYSICAL THERAPY | Facility: CLINIC | Age: 24
End: 2019-02-18
Payer: COMMERCIAL

## 2019-02-18 DIAGNOSIS — M54.6 ACUTE RIGHT-SIDED THORACIC BACK PAIN: ICD-10-CM

## 2019-02-18 PROCEDURE — 97110 THERAPEUTIC EXERCISES: CPT | Mod: GP | Performed by: PHYSICAL THERAPIST

## 2019-02-18 PROCEDURE — 97530 THERAPEUTIC ACTIVITIES: CPT | Mod: GP | Performed by: PHYSICAL THERAPIST

## 2019-02-18 PROCEDURE — 97140 MANUAL THERAPY 1/> REGIONS: CPT | Mod: GP | Performed by: PHYSICAL THERAPIST

## 2019-03-04 ENCOUNTER — THERAPY VISIT (OUTPATIENT)
Dept: PHYSICAL THERAPY | Facility: CLINIC | Age: 24
End: 2019-03-04
Payer: COMMERCIAL

## 2019-03-04 DIAGNOSIS — M54.6 ACUTE RIGHT-SIDED THORACIC BACK PAIN: ICD-10-CM

## 2019-03-04 PROCEDURE — 97140 MANUAL THERAPY 1/> REGIONS: CPT | Mod: GP | Performed by: PHYSICAL THERAPIST

## 2019-03-04 PROCEDURE — 97112 NEUROMUSCULAR REEDUCATION: CPT | Mod: GP | Performed by: PHYSICAL THERAPIST

## 2019-03-04 PROCEDURE — 97110 THERAPEUTIC EXERCISES: CPT | Mod: GP | Performed by: PHYSICAL THERAPIST

## 2019-03-04 NOTE — LETTER
[facility] ATHLETIC [facility] PHYSICAL THERAPY  [address]  [place] [address]  [phone]    2019    Re: Danielle Do   :   1995  MRN:  [id number]   REFERRING PHYSICIAN:   Rajeev Ritter    [facility]    Date of Initial Evaluation: 2019  Visits:  Rxs Used: 3  Reason for Referral:  Acute right-sided thoracic back pain    DISCHARGE REPORT  Progress reporting period is from 2019 to 3/4/2019.       SUBJECTIVE  Subjective changes noted by patient:  I am getting better. No pain at the present, some tightness along the right mid thoracic area    Current Pain level: 0/10.     Initial Pain level: 3/10.   Changes in function:  Yes (See Goal flowsheet attached for changes in current functional level)  Adverse reaction to treatment or activity: None  HPI  Oswestry Score: 4 %    OBJECTIVE  Changes noted in objective findings:  Yes,  TROM flexion toes, extension WFL, rotation right WFL, left 80% pulling on the right side.  Decrease in tightness and tenderness in thoracic spine.  Increase awareness of posture and sitting.  Discussed with patient progression of exercises and home exercise program.      ASSESSMENT/PLAN  Updated problem list and treatment plan: Diagnosis 1:  Thoracic pain  Pain -  home program  Decreased ROM/flexibility - home program  Decreased joint mobility - home program  Impaired posture - home program  STG/LTGs have been met or progress has been made towards goals:  Yes (See Goal flow sheet completed today.)  Assessment of Progress: The patient's condition is improving.  The patient's condition has potential to improve.  Self Management Plans:  Patient has been instructed in a home treatment program.  Patient  has been instructed in self management of symptoms.    Recommendations:  Patient to try exercises on her own.  Patient to call if any questions.  Patient does  have   Re: Danielle Do   :   1995    more appointments left on original order and may return if unable to progress with exercises or if pain returns.  Thank you for the opportunity of working with this motivated individual.    Thank you for your referral.    INQUIRIES  Therapist: Joanne Dixon PT  [facility] PHYSICAL THERAPY  [address] [address]  Phone: [phone]  Fax: [phone]

## 2019-03-05 NOTE — PROGRESS NOTES
Subjective:  HPI  Oswestry Score: 4 %                 Objective:  System    Physical Exam    General     ROS    Assessment/Plan:    DISCHARGE REPORT    Progress reporting period is from 2/11/2019 to 3/4/2019.       SUBJECTIVE  Subjective changes noted by patient:  I am getting better. No pain at the present, some tightness along the right mid thoracic area     Current Pain level: 0/10.     Initial Pain level: 3/10.   Changes in function:  Yes (See Goal flowsheet attached for changes in current functional level)  Adverse reaction to treatment or activity: None    OBJECTIVE  Changes noted in objective findings:  Yes,  TROM flexion toes, extension WFL, rotation right WFL, left 80% pulling on the right side.  Decrease in tightness and tenderness in thoracic spine.  Increase awareness of posture and sitting.  Discussed with patient progression of exercises and home exercise program.        ASSESSMENT/PLAN  Updated problem list and treatment plan: Diagnosis 1:  Thoracic pain  Pain -  home program  Decreased ROM/flexibility - home program  Decreased joint mobility - home program  Impaired posture - home program  STG/LTGs have been met or progress has been made towards goals:  Yes (See Goal flow sheet completed today.)  Assessment of Progress: The patient's condition is improving.  The patient's condition has potential to improve.  Self Management Plans:  Patient has been instructed in a home treatment program.  Patient  has been instructed in self management of symptoms.      Recommendations:  Patient to try exercises on her own.  Patient to call if any questions.  Patient does have more appointments left on original order and may return if unable to progress with exercises or if pain returns.  Thank you for the opportunity of working with this motivated individual.    Please refer to the daily flowsheet for treatment today, total treatment time and time spent performing 1:1 timed codes.

## 2019-06-15 DIAGNOSIS — Z30.011 INITIATION OF OCP (BCP): ICD-10-CM

## 2019-06-17 RX ORDER — DESOGESTREL AND ETHINYL ESTRADIOL 0.15-0.03
1 KIT ORAL DAILY
Qty: 28 TABLET | Refills: 0 | Status: SHIPPED | OUTPATIENT
Start: 2019-06-17 | End: 2019-10-25

## 2019-06-17 NOTE — TELEPHONE ENCOUNTER
Medication is being filled for 1 time refill only due to:  Patient needs to be seen because will be due for annual exam in July.  - note added to sig and sent to pharmacy    Estefanía Washington, RN  Triage Nurse

## 2019-06-17 NOTE — TELEPHONE ENCOUNTER
"Requested Prescriptions   Pending Prescriptions Disp Refills     APRI 0.15-30 MG-MCG tablet [Pharmacy Med Name: APRI 28 DAY TABLET] 84 tablet 2     Sig: TAKE 1 TABLET BY MOUTH EVERY DAY  Last Written Prescription Date:  07/10/2018  Last Fill Quantity: 84,  # refills: 3   Last office visit: 1/28/2019 with prescribing provider:  01/28/2019   Future Office Visit:         Contraceptives Protocol Passed - 6/15/2019  8:34 AM        Passed - Patient is not a current smoker if age is 35 or older        Passed - Recent (12 mo) or future (30 days) visit within the authorizing provider's specialty     Patient had office visit in the last 12 months or has a visit in the next 30 days with authorizing provider or within the authorizing provider's specialty.  See \"Patient Info\" tab in inbasket, or \"Choose Columns\" in Meds & Orders section of the refill encounter.              Passed - Medication is active on med list        Passed - No active pregnancy on record        Passed - No positive pregnancy test in past 12 months        "

## 2019-06-27 PROBLEM — M54.6 ACUTE RIGHT-SIDED THORACIC BACK PAIN: Status: RESOLVED | Noted: 2019-01-28 | Resolved: 2019-06-27

## 2019-10-02 ENCOUNTER — HEALTH MAINTENANCE LETTER (OUTPATIENT)
Age: 24
End: 2019-10-02

## 2019-10-25 ENCOUNTER — OFFICE VISIT (OUTPATIENT)
Dept: FAMILY MEDICINE | Facility: CLINIC | Age: 24
End: 2019-10-25
Payer: COMMERCIAL

## 2019-10-25 VITALS
BODY MASS INDEX: 45.69 KG/M2 | WEIGHT: 291.1 LBS | TEMPERATURE: 98.1 F | SYSTOLIC BLOOD PRESSURE: 136 MMHG | DIASTOLIC BLOOD PRESSURE: 85 MMHG | HEART RATE: 70 BPM | OXYGEN SATURATION: 97 % | HEIGHT: 67 IN

## 2019-10-25 DIAGNOSIS — Z30.011 INITIATION OF OCP (BCP): ICD-10-CM

## 2019-10-25 DIAGNOSIS — Z00.00 ROUTINE GENERAL MEDICAL EXAMINATION AT A HEALTH CARE FACILITY: Primary | ICD-10-CM

## 2019-10-25 DIAGNOSIS — R03.0 ELEVATED BP WITHOUT DIAGNOSIS OF HYPERTENSION: ICD-10-CM

## 2019-10-25 LAB
ALBUMIN SERPL-MCNC: 4.1 G/DL (ref 3.4–5)
ALP SERPL-CCNC: 96 U/L (ref 40–150)
ALT SERPL W P-5'-P-CCNC: 56 U/L (ref 0–50)
ANION GAP SERPL CALCULATED.3IONS-SCNC: 6 MMOL/L (ref 3–14)
AST SERPL W P-5'-P-CCNC: 18 U/L (ref 0–45)
BILIRUB SERPL-MCNC: 0.3 MG/DL (ref 0.2–1.3)
BUN SERPL-MCNC: 8 MG/DL (ref 7–30)
CALCIUM SERPL-MCNC: 8.7 MG/DL (ref 8.5–10.1)
CHLORIDE SERPL-SCNC: 107 MMOL/L (ref 94–109)
CHOLEST SERPL-MCNC: 173 MG/DL
CO2 SERPL-SCNC: 26 MMOL/L (ref 20–32)
CREAT SERPL-MCNC: 0.64 MG/DL (ref 0.52–1.04)
GFR SERPL CREATININE-BSD FRML MDRD: >90 ML/MIN/{1.73_M2}
GLUCOSE SERPL-MCNC: 83 MG/DL (ref 70–99)
HDLC SERPL-MCNC: 63 MG/DL
LDLC SERPL CALC-MCNC: 97 MG/DL
NONHDLC SERPL-MCNC: 110 MG/DL
POTASSIUM SERPL-SCNC: 4 MMOL/L (ref 3.4–5.3)
PROT SERPL-MCNC: 7.8 G/DL (ref 6.8–8.8)
SODIUM SERPL-SCNC: 139 MMOL/L (ref 133–144)
TRIGL SERPL-MCNC: 63 MG/DL
TSH SERPL DL<=0.005 MIU/L-ACNC: 1.13 MU/L (ref 0.4–4)

## 2019-10-25 PROCEDURE — 90471 IMMUNIZATION ADMIN: CPT | Performed by: FAMILY MEDICINE

## 2019-10-25 PROCEDURE — 99395 PREV VISIT EST AGE 18-39: CPT | Mod: 25 | Performed by: FAMILY MEDICINE

## 2019-10-25 PROCEDURE — 36415 COLL VENOUS BLD VENIPUNCTURE: CPT | Performed by: FAMILY MEDICINE

## 2019-10-25 PROCEDURE — 90686 IIV4 VACC NO PRSV 0.5 ML IM: CPT | Performed by: FAMILY MEDICINE

## 2019-10-25 PROCEDURE — 80053 COMPREHEN METABOLIC PANEL: CPT | Performed by: FAMILY MEDICINE

## 2019-10-25 PROCEDURE — 80061 LIPID PANEL: CPT | Performed by: FAMILY MEDICINE

## 2019-10-25 PROCEDURE — 84443 ASSAY THYROID STIM HORMONE: CPT | Performed by: FAMILY MEDICINE

## 2019-10-25 RX ORDER — DESOGESTREL AND ETHINYL ESTRADIOL 0.15-0.03
1 KIT ORAL DAILY
Qty: 84 TABLET | Refills: 3 | Status: SHIPPED | OUTPATIENT
Start: 2019-10-25 | End: 2020-09-28

## 2019-10-25 ASSESSMENT — MIFFLIN-ST. JEOR: SCORE: 2098.05

## 2019-10-25 NOTE — PROGRESS NOTES
SUBJECTIVE:   CC: Danielle Do is an 24 year old woman who presents for preventive health visit.     Healthy Habits:    Do you get at least three servings of calcium containing foods daily (dairy, green leafy vegetables, etc.)? yes    Amount of exercise or daily activities, outside of work: none    Problems taking medications regularly No    Medication side effects: No    Have you had an eye exam in the past two years? no    Do you see a dentist twice per year? yes    Do you have sleep apnea, excessive snoring or daytime drowsiness?no      Has had bordelrine high BP   non smoker    Today's PHQ-2 Score:   PHQ-2 ( 1999 [employer]) 7/10/2018 3/16/2017   Q1: Little interest or pleasure in doing things 0 0   Q2: Feeling down, depressed or hopeless 0 0   PHQ-2 Score 0 0       Abuse: Current or Past(Physical, Sexual or Emotional)- No  Do you feel safe in your environment? Yes    Social History     Tobacco Use     Smoking status: Never Smoker     Smokeless tobacco: Never Used   Substance Use Topics     Alcohol use: Yes     Comment: 2 per week      If you drink alcohol do you typically have >3 drinks per day or >7 drinks per week? No                     Reviewed orders with patient.  Reviewed health maintenance and updated orders accordingly - Yes  Lab work is in process    Mammogram not appropriate for this patient based on age.    Pertinent mammograms are reviewed under the imaging tab.  History of abnormal Pap smear: NO - age 30-65 PAP every 5 years with negative HPV co-testing recommended  PAP / HPV 7/10/2018   PAP NIL     Reviewed and updated as needed this visit by clinical staff         Reviewed and updated as needed this visit by Provider        History reviewed. No pertinent past medical history.     ROS:  CONSTITUTIONAL: NEGATIVE for fever, chills, change in weight  INTEGUMENTARU/SKIN: NEGATIVE for worrisome rashes, moles or lesions  EYES: NEGATIVE for vision changes or irritation  ENT: NEGATIVE for ear, mouth  and throat problems  RESP: NEGATIVE for significant cough or SOB  BREAST: NEGATIVE for masses, tenderness or discharge  CV: NEGATIVE for chest pain, palpitations or peripheral edema  GI: NEGATIVE for nausea, abdominal pain, heartburn, or change in bowel habits  : NEGATIVE for unusual urinary or vaginal symptoms. Periods are regular.  MUSCULOSKELETAL: NEGATIVE for significant arthralgias or myalgia  NEURO: NEGATIVE for weakness, dizziness or paresthesias  PSYCHIATRIC: NEGATIVE for changes in mood or affect    OBJECTIVE:   There were no vitals taken for this visit.  EXAM:  GENERAL: healthy, alert and no distress  EYES: Eyes grossly normal to inspection, PERRL and conjunctivae and sclerae normal  HENT: ear canals and TM's normal, nose and mouth without ulcers or lesions  NECK: no adenopathy, no asymmetry, masses, or scars and thyroid normal to palpation  RESP: lungs clear to auscultation - no rales, rhonchi or wheezes  BREAST: normal without masses, tenderness or nipple discharge and no palpable axillary masses or adenopathy  CV: regular rate and rhythm, normal S1 S2, no S3 or S4, no murmur, click or rub, no peripheral edema and peripheral pulses strong  ABDOMEN: soft, nontender, no hepatosplenomegaly, no masses and bowel sounds normal  MS: no gross musculoskeletal defects noted, no edema  SKIN: no suspicious lesions or rashes  NEURO: Normal strength and tone, mentation intact and speech normal  PSYCH: mentation appears normal, affect normal/bright  LYMPH: no cervical, supraclavicular, axillary, or inguinal adenopathy    Diagnostic Test Results:  Labs reviewed in Epic    ASSESSMENT/PLAN:   1. Routine general medical examination at a health care facility  Overall healthy but needs to work on diet/ exercise    Moniter BP  Call us in 1 month with 15-20  readings  - Comprehensive metabolic panel  - TSH with free T4 reflex  - Lipid Profile    2. Initiation of OCP (BCP)  Resume med  - desogestrel-ethinyl estradiol (APRI)  "0.15-30 MG-MCG tablet; Take 1 tablet by mouth daily  Dispense: 84 tablet; Refill: 3    COUNSELING:   Reviewed preventive health counseling, as reflected in patient instructions       BP monitering       Regular exercise       Healthy diet/nutrition       Vision screening       Hearing screening       Contraception       Family planning       Safe sex practices/STD prevention    Estimated body mass index is 42.67 kg/m  as calculated from the following:    Height as of 1/28/19: 1.702 m (5' 7.01\").    Weight as of 1/28/19: 123.6 kg (272 lb 8 oz).    Weight management plan: Discussed healthy diet and exercise guidelines     reports that she has never smoked. She has never used smokeless tobacco.      Counseling Resources:  ATP IV Guidelines  Pooled Cohorts Equation Calculator  Breast Cancer Risk Calculator  FRAX Risk Assessment  ICSI Preventive Guidelines  Dietary Guidelines for Americans, 2010  USDA's MyPlate  ASA Prophylaxis  Lung CA Screening    Cruzito Edouard MD  [facility]  "

## 2019-10-28 ENCOUNTER — TELEPHONE (OUTPATIENT)
Dept: FAMILY MEDICINE | Facility: CLINIC | Age: 24
End: 2019-10-28

## 2019-10-28 NOTE — TELEPHONE ENCOUNTER
Called patient and given results that all labs are normal. Patient states she will MyChart in 4-6 weeks with BP readings.   Mriian Jay RN on 10/28/2019 at 9:58 AM

## 2020-07-23 ENCOUNTER — E-VISIT (OUTPATIENT)
Dept: FAMILY MEDICINE | Facility: CLINIC | Age: 25
End: 2020-07-23
Payer: COMMERCIAL

## 2020-07-23 DIAGNOSIS — F33.1 MODERATE RECURRENT MAJOR DEPRESSION (H): Primary | ICD-10-CM

## 2020-07-23 PROCEDURE — 99422 OL DIG E/M SVC 11-20 MIN: CPT | Performed by: FAMILY MEDICINE

## 2020-07-23 ASSESSMENT — PATIENT HEALTH QUESTIONNAIRE - PHQ9
SUM OF ALL RESPONSES TO PHQ QUESTIONS 1-9: 14
10. IF YOU CHECKED OFF ANY PROBLEMS, HOW DIFFICULT HAVE THESE PROBLEMS MADE IT FOR YOU TO DO YOUR WORK, TAKE CARE OF THINGS AT HOME, OR GET ALONG WITH OTHER PEOPLE: SOMEWHAT DIFFICULT
SUM OF ALL RESPONSES TO PHQ QUESTIONS 1-9: 14

## 2020-07-24 RX ORDER — FLUOXETINE 10 MG/1
10 CAPSULE ORAL DAILY
Qty: 90 CAPSULE | Refills: 1 | Status: SHIPPED | OUTPATIENT
Start: 2020-07-24 | End: 2020-11-16

## 2020-07-24 ASSESSMENT — PATIENT HEALTH QUESTIONNAIRE - PHQ9: SUM OF ALL RESPONSES TO PHQ QUESTIONS 1-9: 14

## 2020-07-24 NOTE — TELEPHONE ENCOUNTER
"SUBJECTIVE:  Danielle Do is a 25 year old female who presents for initial evaluation of depressive symptoms. Current symptoms include depressed mood, appetite change, anxiety, fatigue, feelings of worthlessness and suicidal thoughts without plan. Onset approximately a few month (s) ago; symptoms have been gradually worsening. Depression risk factors: previous episode of depression.    Previous history of depression: Yes and Treated with medication(s):   Current thoughts of suicide or homicide:Yes  Guns stored in the home: No   Previous treatment modalities employed include individual therapy.     REVIEW OF SYSTEMS  CONSTITUTIONAL:NEGATIVE for fever, chills  , with weight watchers has change in weight  INTEGUMENTARY/SKIN: NEGATIVE for worrisome rashes, moles or lesions  EYES: NEGATIVE for vision changes or irritation  ENT/MOUTH: NEGATIVE for ear, mouth and throat problems  CV: NEGATIVE for chest pain, palpitations or peripheral edema  GI: NEGATIVE for nausea, abdominal pain, heartburn, or change in bowel habits  : NEGATIVE for frequency, dysuria, or hematuria  NEURO: NEGATIVE for weakness, dizziness or paresthesias    OBJECTIVE:  Presentation- normal:Abstract reasoning  Attention and concentration  Coherency and relevance of thought  Judgment  Memory,abnormal:Mood    ASSESSMENT:  Recurrent Depression (311)   denies suicidal plan \" would never do that\", agrees to no harm contract    PLAN:  Initiate medication with [id number] mg  Reviewed concept of depression as function of biochemical imbalance of neurotransmitters/rationale for treatment.  Risks and benefits of medication(s) reviewed with patient.  Questions answered.  Counseling advised  Followup appointment in 7 day(s)  Patient instructed to call for significant side effects medications or problems  Patient advised immediate presentation to hospital for suicidal thought, etc.    No-harm verbal contract agreed upon        Provider E-Visit time total " (minutes): 18

## 2020-08-06 ENCOUNTER — VIRTUAL VISIT (OUTPATIENT)
Dept: PSYCHOLOGY | Facility: CLINIC | Age: 25
End: 2020-08-06
Payer: COMMERCIAL

## 2020-08-06 DIAGNOSIS — F33.1 MAJOR DEPRESSIVE DISORDER, RECURRENT, MODERATE (H): Primary | ICD-10-CM

## 2020-08-06 PROCEDURE — 99207 ZZC NO BILLABLE SERVICE THIS VISIT: CPT | Performed by: SOCIAL WORKER

## 2020-08-06 ASSESSMENT — PATIENT HEALTH QUESTIONNAIRE - PHQ9: SUM OF ALL RESPONSES TO PHQ QUESTIONS 1-9: 12

## 2020-08-06 ASSESSMENT — COLUMBIA-SUICIDE SEVERITY RATING SCALE - C-SSRS
1. IN THE PAST MONTH, HAVE YOU WISHED YOU WERE DEAD OR WISHED YOU COULD GO TO SLEEP AND NOT WAKE UP?: NO
1. IN THE PAST MONTH, HAVE YOU WISHED YOU WERE DEAD OR WISHED YOU COULD GO TO SLEEP AND NOT WAKE UP?: NO
2. HAVE YOU ACTUALLY HAD ANY THOUGHTS OF KILLING YOURSELF LIFETIME?: NO

## 2020-08-06 NOTE — PROGRESS NOTES
Progress Note - Initial Session    Client Name:  Danielle Do Date: 8/6/2020           Service Type: Individual      Session Start Time: 10:30am  Session End Time: 11:15am     Session Length: 45mn    Session #: 1    Attendees: Client attended alone    Service Modality:  Video Visit:    Telemedicine Visit: The patient's condition can be safely assessed and treated via synchronous audio and visual telemedicine encounter.      Reason for Telemedicine Visit: Patient has requested telehealth visit    Originating Site (Patient Location): Patient's home    Distant Site (Provider Location): Provider Remote Setting    Consent:  The patient/guardian has verbally consented to: the potential risks and benefits of telemedicine (video visit) versus in person care; bill my insurance or make self-payment for services provided; and responsibility for payment of non-covered services.     Patient would like the video invitation sent by: Other e-mail: through My Chart}     Mode of Communication:  Video Conference via [employer]    As the provider I attest to compliance with applicable laws and regulations related to telemedicine.       DATA:  Diagnostic Assessment in progress.  The Patient wants help with Depression management and developing a support system. Also, needs help with general life transitions.       Interactive Complexity: No  Crisis: No    Intervention:  Supportive approach to a Mindful way through Depression.    ASSESSMENT:  Mental Status Assessment:  Appearance:   Appropriate   Eye Contact:   Good   Psychomotor Behavior: Normal   Attitude:   Cooperative  Interested Playful Friendly Pleasant  Orientation:   Person Place Time Situation  Speech   Rate / Production: Normal/ Responsive   Volume:  Normal   Mood:    Anxious  Depressed   Affect:    Tearful Worrisome   Thought Content:  Clear   Thought Form:  Coherent  Goal Directed   Insight:    Good       Safety Issues and Plan for Safety and Risk  Management:     [place] Suicide Severity Rating Scale (Lifetime/Recent)  [place] Suicide Severity Rating (Lifetime/Recent) 8/6/2020   1. Wish to be Dead (Lifetime) No   1. Wish to be Dead (Recent) No   2. Non-Specific Active Suicidal Thoughts (Lifetime) No   Has subject engaged in non-suicidal self-injurious behavior? (Lifetime) Yes   Has subject engaged in non-suicidal self-injurious behavior? (Past 3 Months) No     Patient denies current fears or concerns for personal safety.  Patient denies current or recent suicidal ideation or behaviors.  Patient denies current or recent homicidal ideation or behaviors.  Patient denies current or recent self injurious behavior or ideation.  Patient denies other safety concerns.  Recommended that patient call 911 or go to the local ED should there be a change in any of these risk factors.  Patient reports there are no firearms in the house.     Diagnostic Criteria:   - Depressed mood. Note: In children and adolescents, can be irritable mood.     - Diminished interest or pleasure in all, or almost all, activities.    - Decreased sleep.    - Fatigue or loss of energy.    - Feelings of worthlessness or excessive guilt.       DSM5 Diagnoses: (Sustained by DSM5 Criteria Listed Above)  Diagnoses: 296.32 (F33.1) Major Depressive Disorder, Recurrent Episode, Moderate _ and With melancholic features  Psychosocial & Contextual Factors: Employed  WHODAS 2.0 (12 item): No flowsheet data found.    Collateral Reports Completed:  Routed note to PCP      PLAN: (Homework, other):  Patient stated that she may follow up for ongoing services with [facility].       Amos Healy, [facility]  August 6, 2020

## 2020-08-20 ENCOUNTER — VIRTUAL VISIT (OUTPATIENT)
Dept: FAMILY MEDICINE | Facility: CLINIC | Age: 25
End: 2020-08-20
Payer: COMMERCIAL

## 2020-08-20 DIAGNOSIS — F32.1 MODERATE MAJOR DEPRESSION (H): Primary | ICD-10-CM

## 2020-08-20 PROCEDURE — 99213 OFFICE O/P EST LOW 20 MIN: CPT | Mod: 95 | Performed by: FAMILY MEDICINE

## 2020-09-28 DIAGNOSIS — Z30.011 INITIATION OF OCP (BCP): ICD-10-CM

## 2020-09-28 RX ORDER — DESOGESTREL AND ETHINYL ESTRADIOL 0.15-0.03
KIT ORAL
Qty: 84 TABLET | Refills: 3 | Status: SHIPPED | OUTPATIENT
Start: 2020-09-28 | End: 2022-04-07

## 2020-09-28 NOTE — TELEPHONE ENCOUNTER
Pt requesting refill   Pending Prescriptions:                       Disp   Refills    APRI 0.15-30 MG-MCG tablet [Pharmacy Med *84 tab*3            Sig: TAKE 1 TABLET BY MOUTH EVERY DAY     Last visit 8/20/20 virtual, 10/25/19 phys.  Pap 7/10/18 due in 3 yrs   Last refill  10/25/20    Prescription approved per [facility] Refill Protocol.    Ghazal Mai RN   [facility]

## 2020-10-28 ENCOUNTER — VIRTUAL VISIT (OUTPATIENT)
Dept: FAMILY MEDICINE | Facility: OTHER | Age: 25
End: 2020-10-28

## 2020-10-28 NOTE — PROGRESS NOTES
"Date: 10/28/2020 12:18:31  Clinician: Skye Mcclain  Clinician NPI: [id number]  Patient: Danielle Liriano  Patient : 1995  Patient Address: [address] [place] [address] [place] [address]  Patient Phone: [phone]  Visit Protocol: URI  Patient Summary:  Danielle is a 25 year old ( : 1995 ) female who initiated a [facility] Visit for COVID-19 (Coronavirus) evaluation and screening. When asked the question \"Please sign me up to receive news, health information and promotions. \", Danielle responded \"Yes\".    Danielle states her symptoms started today.   Her symptoms consist of a headache, a cough, myalgia, chills, and malaise. She is experiencing mild difficulty breathing with activities but can speak normally in full sentences.   Symptom details     Cough: Danielle coughs almost every minute and her cough is not more bothersome at night. Phlegm does not come into her throat when she coughs. She does not believe her cough is caused by post-nasal drip.     Headache: She states the headache is mild (1-3 on a 10 point pain scale).      Danielle denies having ear pain, wheezing, fever, nasal congestion, nausea, facial pain or pressure, sore throat, teeth pain, ageusia, diarrhea, anosmia, vomiting, and rhinitis. She also denies having recent facial or sinus surgery in the past 60 days and taking antibiotic medication in the past month.   Precipitating events  She has not recently been exposed to someone with influenza. Danielle has not been in close contact with any high risk individuals.   Pertinent COVID-19 (Coronavirus) information  Danielle does not work or volunteer as healthcare worker or a . In the past 14 days, Danielle has not worked or volunteered at a healthcare facility or group living setting.   In the past 14 days, she also has not lived in a congregate living setting.   Danielle has not had a close contact with a laboratory-confirmed COVID-19 patient within 14 days of symptom onset.    " Since December 2019, Danielle has not been diagnosed with lab-confirmed COVID-19 test and has not had upper respiratory infection or influenza-like illness.   Pertinent medical history  Danielle does not get yeast infections when she takes antibiotics.   Danielle needs a return to work/school note.   Weight: 225 lbs   Danielle does not smoke or use smokeless tobacco.   She denies pregnancy and denies breastfeeding. She has menstruated in the past month.   Weight: 225 lbs    MEDICATIONS: fluoxetine oral, Apri oral, ALLERGIES: NKDA  Clinician Response:  Dear Danielle,  Based on the information provided, you have a viral upper respiratory infection, otherwise known as a cold. Symptoms vary from person to person, but can include sneezing, coughing, a runny nose, sore throat, and headache and range from mild to severe.  Unfortunately, there are no medications that can cure a cold, so treatment is focused on controlling symptoms as much as possible. Most people gradually feel better until symptoms are gone in 1-2 weeks.  Medication information  Because you have a viral infection, antibiotics will not help you get better. Treating a viral infection with antibiotics could actually make you feel worse.  Self care  Steps you can take to be as comfortable as possible:     Rest.    Drink plenty of fluids.    Take a spoonful of honey to reduce your cough.     When to seek care  Please be seen in a clinic or urgent care if any of the following occur:   New symptoms develop, or symptoms become worse   Call ahead before going to the clinic or urgent care.  Additional treatment plan   Your symptoms show that you may have coronavirus (COVID-19). This illness can cause fever, cough and trouble breathing. Many people get a mild case and get better on their own. Some people can get very sick.  Based on the symptoms you have shared, I would like you to be re-checked in 2 to 3 days. Please call your family clinic to set up a video or  "phone visit.  Will I be tested for COVID-19?  We would like to test you for this virus.   Please call [phone] to schedule your visit. Explain that you were referred by [facility] to have a COVID-19 test. Be ready to share your [facility] visit ID number.    The following will serve as your written order for this COVID Test, ordered by me, for the indication of suspected COVID [Z20.828]: The test will be ordered in [facility], our electronic health record, after you are scheduled. It will show as ordered and authorized by Marc Mejias MD.  Order: COVID-19 (Coronavirus) PCR for SYMPTOMATIC testing from [facility].   1.When it's time for your COVID test:   Stay at least 6 feet away from others. (If someone will drive you to your test, stay in the backseat, as far away from the  as you can.)   Cover your mouth and nose with a mask, tissue or washcloth.  Go straight to the testing site. Don't make any stops on the way there or back.      2.Starting now: Stay home and away from others (self-isolate) until:   You've had no fever---and no medicine that reduces fever---for one full day (24 hours). And...   Your other symptoms have gotten better. For example, your cough or breathing has improved. And...   At least 10 days have passed since your symptoms started.       During this time, don't leave the house except for testing or medical care.   Stay in your own room, even for meals. Use your own bathroom if you can.   Stay away from others in your home. No hugging, kissing or shaking hands. No visitors.  Don't go to work, school or anywhere else.    Clean \"high touch\" surfaces often (doorknobs, counters, handles, etc.). Use a household cleaning spray or wipes. You'll find a full list of  on the EPA website: www.epa.gov/pesticide-registration/list-n-disinfectants-use-against-sars-cov-2.   Cover your mouth and nose with a mask, tissue or washcloth to avoid spreading germs.  Wash your hands and face often. Use soap and water.  " Caregivers in these groups are at risk for severe illness due to COVID-19:  o People 65 years and older  o People who live in a nursing home or long-term care facility  o People with chronic disease (lung, heart, cancer, diabetes, kidney, liver, immunologic)   o People who have a weakened immune system, including those who:   Are in cancer treatment  Take medicine that weakens the immune system, such as corticosteroids  Had a bone marrow or organ transplant  Have an immune deficiency  Have poorly controlled HIV or AIDS  Are obese (body mass index of 40 or higher)  Smoke regularly   o Caregivers should wear gloves while washing dishes, handling laundry and cleaning bedrooms and bathrooms.  o Use caution when washing and drying laundry: Don't shake dirty laundry, and use the warmest water setting that you can.  o For more tips, go to www.cdc.gov/coronavirus/2019-ncov/downloads/10Things.pdf.      How can I take care of myself?   Get lots of rest. Drink extra fluids (unless a doctor has told you not to)   Take Tylenol (acetaminophen) for fever or pain. If you have liver or kidney problems, ask your family doctor if it's okay to take Tylenol.   Adults can take either:    650 mg (two 325 mg pills) every 4 to 6 hours, or...   1,000 mg (two 500 mg pills) every 8 hours as needed.    Note: Don't take more than 3,000 mg in one day. Acetaminophen is found in many medicines (both prescribed and over-the-counter medicines). Read all labels to be sure you don't take too much.   For children, check the Tylenol bottle for the right dose. The dose is based on the child's age or weight.    If you have other health problems (like cancer, heart failure, an organ transplant or severe kidney disease): Call your specialty clinic if you don't feel better in the next 2 days.       Know when to call 911. Emergency warning signs include:    Trouble breathing or shortness of breath Pain or pressure in the chest that doesn't go away Feeling  confused like you haven't felt before, or not being able to wake up Bluish-colored lips or face  Where can I get more information?   [facility] -- About COVID-19: [email or web address]   CDC -- What to Do If You're Sick: www.cdc.gov/coronavirus/2019-ncov/about/steps-when-sick.html   CDC -- Ending Home Isolation: www.cdc.gov/coronavirus/2019-ncov/hcp/disposition-in-home-patients.html   [facility] -- Caring for Someone: www.cdc.gov/coronavirus/2019-ncov/if-you-are-sick/care-for-someone.html   [facility] -- Interim Guidance for Hospital Discharge to Home: www.[facility].[place].us/diseases/coronavirus/hcp/hospdischarge.pdf   [facility] clinical trials (COVID-19 research studies): clinicalaffairs.[facility]-clinical-trials    Below are the COVID-19 hotlines at the [facility] of Health ([facility]). Interpreters are available.    For health questions: Call 548-598-3116 or 1-665.235.1135 (7 a.m. to 7 p.m.) For questions about schools and childcare: Call 397-418-6863 or [phone] (7 a.m. to 7 p.m.)       Diagnosis: Contact with and (suspected) exposure to other viral communicable diseases  Diagnosis ICD: Z20.828

## 2020-10-30 DIAGNOSIS — Z20.822 SUSPECTED COVID-19 VIRUS INFECTION: Primary | ICD-10-CM

## 2020-11-16 ENCOUNTER — VIRTUAL VISIT (OUTPATIENT)
Dept: FAMILY MEDICINE | Facility: CLINIC | Age: 25
End: 2020-11-16
Payer: COMMERCIAL

## 2020-11-16 DIAGNOSIS — F32.1 MODERATE MAJOR DEPRESSION (H): ICD-10-CM

## 2020-11-16 PROCEDURE — 99213 OFFICE O/P EST LOW 20 MIN: CPT | Mod: 95 | Performed by: FAMILY MEDICINE

## 2020-12-07 ENCOUNTER — MYC MEDICAL ADVICE (OUTPATIENT)
Dept: FAMILY MEDICINE | Facility: CLINIC | Age: 25
End: 2020-12-07

## 2021-01-15 ENCOUNTER — HEALTH MAINTENANCE LETTER (OUTPATIENT)
Age: 26
End: 2021-01-15

## 2021-01-16 DIAGNOSIS — F33.1 MODERATE RECURRENT MAJOR DEPRESSION (H): ICD-10-CM

## 2021-01-16 DIAGNOSIS — F32.1 MODERATE MAJOR DEPRESSION (H): ICD-10-CM

## 2021-01-17 RX ORDER — FLUOXETINE 10 MG/1
CAPSULE ORAL
Qty: 0.1 CAPSULE | Refills: 0 | OUTPATIENT
Start: 2021-01-17

## 2021-01-18 NOTE — TELEPHONE ENCOUNTER
"Requested Prescriptions   Pending Prescriptions Disp Refills     FLUoxetine (PROZAC) 10 MG capsule [Pharmacy Med Name: FLUOXETINE HCL 10 MG CAPSULE] 90 capsule 1     Sig: TAKE 1 CAPSULE BY MOUTH EVERY DAY       SSRIs Protocol Failed - 1/16/2021  9:14 AM        Failed - PHQ-9 score less than 5 in past 6 months     Please review last PHQ-9 score.     PHQ 7/23/2020 8/5/2020 8/6/2020   PHQ-9 Total Score 14 10 12   Q9: Thoughts of better off dead/self-harm past 2 weeks More than half the days Several days Not at all   F/U: Thoughts of suicide or self-harm Yes Yes -   F/U: Self harm-plan Yes No -   F/U: Self-harm action No No -   F/U: Safety concerns Yes No -               Passed - Medication is active on med list        Passed - Patient is age 18 or older        Passed - No active pregnancy on record        Passed - No positive pregnancy test in last 12 months        Passed - Recent (6 mo) or future (30 days) visit within the authorizing provider's specialty     Patient had office visit in the last 6 months or has a visit in the next 30 days with authorizing provider or within the authorizing provider's specialty.  See \"Patient Info\" tab in inbasket, or \"Choose Columns\" in Meds & Orders section of the refill encounter.               Advised pharmacy 6 months sent 11/16 to [facility] [id number] [place] [address]    Refused Prescriptions:                       Disp   Refills    FLUoxetine (PROZAC) 10 MG capsule [Pharmac*0.1 ca*0        Sig: TAKE 1 CAPSULE BY MOUTH EVERY DAY  Refused By: SWATI MICHAEL  Reason for Refusal: Duplicate              "

## 2021-02-08 DIAGNOSIS — F32.1 MODERATE MAJOR DEPRESSION (H): ICD-10-CM

## 2021-02-09 NOTE — TELEPHONE ENCOUNTER
"Requested Prescriptions   Pending Prescriptions Disp Refills     FLUoxetine (PROZAC) 20 MG capsule [Pharmacy Med Name: FLUOXETINE HCL 20 MG CAPSULE] 90 capsule 1     Sig: TAKE 1 CAPSULE BY MOUTH EVERY DAY       SSRIs Protocol Failed - 2/8/2021 12:14 AM        Failed - PHQ-9 score less than 5 in past 6 months     Please review last PHQ-9 score.     PHQ 7/23/2020 8/5/2020 8/6/2020   PHQ-9 Total Score 14 10 12   Q9: Thoughts of better off dead/self-harm past 2 weeks More than half the days Several days Not at all   F/U: Thoughts of suicide or self-harm Yes Yes -   F/U: Self harm-plan Yes No -   F/U: Self-harm action No No -   F/U: Safety concerns Yes No -               Passed - Medication is active on med list        Passed - Patient is age 18 or older        Passed - No active pregnancy on record        Passed - No positive pregnancy test in last 12 months        Passed - Recent (6 mo) or future (30 days) visit within the authorizing provider's specialty     Patient had office visit in the last 6 months or has a visit in the next 30 days with authorizing provider or within the authorizing provider's specialty.  See \"Patient Info\" tab in inbasket, or \"Choose Columns\" in Meds & Orders section of the refill encounter.               Routing refill request to provider for review/approval because:  PHQ9 >5 and needs updating      RJ MA - please update PHQ9    "

## 2021-04-21 DIAGNOSIS — F33.1 MODERATE RECURRENT MAJOR DEPRESSION (H): ICD-10-CM

## 2021-04-21 NOTE — TELEPHONE ENCOUNTER
"Requested Prescriptions   Pending Prescriptions Disp Refills     FLUoxetine (PROZAC) 10 MG capsule [Pharmacy Med Name: FLUOXETINE HCL 10 MG CAPSULE] 90 capsule 1     Sig: TAKE 1 CAPSULE BY MOUTH EVERY DAY       SSRIs Protocol Failed - 4/21/2021 12:58 AM        Failed - PHQ-9 score less than 5 in past 6 months     Please review last PHQ-9 score.           Passed - Medication is active on med list        Passed - Patient is age 18 or older        Passed - No active pregnancy on record        Passed - No positive pregnancy test in last 12 months        Passed - Recent (6 mo) or future (30 days) visit within the authorizing provider's specialty     Patient had office visit in the last 6 months or has a visit in the next 30 days with authorizing provider or within the authorizing provider's specialty.  See \"Patient Info\" tab in inbasket, or \"Choose Columns\" in Meds & Orders section of the refill encounter.             Due for PHQ-9, sent to patient in my chart message to fill out    Hayley Bobby RN   [facility]   "

## 2021-04-26 RX ORDER — FLUOXETINE 10 MG/1
CAPSULE ORAL
Qty: 90 CAPSULE | Refills: 0 | Status: SHIPPED | OUTPATIENT
Start: 2021-04-26 | End: 2022-04-07

## 2021-04-26 NOTE — TELEPHONE ENCOUNTER
Routing refill request to provider for review/approval because:  Due for PHQ9 - greater than 72 hours since request was made - patient had dose increase at last office visit - do you want f/u appointment?    Last office visit 11/16/20 -     Moderate major depression (H)  Was helping, now not as much  Increase dose to  - FLUoxetine (PROZAC) 20 MG capsule; Take 2 capsules (40 mg) by mouth daily          ORVILLE LOREDO - please update PHQ9

## 2021-07-25 DIAGNOSIS — F32.1 MODERATE MAJOR DEPRESSION (H): ICD-10-CM

## 2021-09-04 ENCOUNTER — HEALTH MAINTENANCE LETTER (OUTPATIENT)
Age: 26
End: 2021-09-04

## 2022-02-19 ENCOUNTER — HEALTH MAINTENANCE LETTER (OUTPATIENT)
Age: 27
End: 2022-02-19

## 2022-04-06 NOTE — PROGRESS NOTES
" SUBJECTIVE:   CC: Danielle Do is an 27 year old woman who presents for preventive health visit.     {Split Bill scripting  The purpose of this visit is to discuss your medical history and prevent health problems before you are sick. You may be responsible for a co-pay, coinsurance, or deductible if your visit today includes services such as checking on a sore throat, having an x-ray or lab test, or treating and evaluating a new or existing condition :152930}  Patient has been advised of split billing requirements and indicates understanding: {Yes and No:574766}  Healthy Habits:    Do you get at least three servings of calcium containing foods daily (dairy, green leafy vegetables, etc.)? { :439347::\"yes\"}    Amount of exercise or daily activities, outside of work: { :221973}    Problems taking medications regularly { :095509::\"No\"}    Medication side effects: { :754909::\"No\"}    Have you had an eye exam in the past two years? { :921181}    Do you see a dentist twice per year? { :413551}    Do you have sleep apnea, excessive snoring or daytime drowsiness?{ :480837}  {Outside tests to abstract? :465519}    {additional problems to add (Optional):587983}    Today's PHQ-2 Score:   PHQ-2 ( 1999 Pfizer) 8/20/2020 10/25/2019   Q1: Little interest or pleasure in doing things 0 0   Q2: Feeling down, depressed or hopeless 0 0   PHQ-2 Score 0 0   PHQ-2 Total Score (12-17 Years)- Positive if 3 or more points; Administer PHQ-A if positive 0 0     {PHQ-2 LOOK IN ASSESSMENTS (Optional) :455030}  Abuse: Current or Past(Physical, Sexual or Emotional)- {YES/NO/NA:306196}  Do you feel safe in your environment? {YES/NO/NA:990202}    Have you ever done Advance Care Planning? (For example, a Health Directive, POLST, or a discussion with a medical provider or your loved ones about your wishes): { :541584}    Social History     Tobacco Use     Smoking status: Never Smoker     Smokeless tobacco: Never Used   Substance Use Topics     " "Alcohol use: Yes     Comment: 2 per week      If you drink alcohol do you typically have >3 drinks per day or >7 drinks per week? {ETOH :960443}                     Reviewed orders with patient.  Reviewed health maintenance and updated orders accordingly - {Yes/No:484699::\"Yes\"}  {Chronicprobdata (Optional):470839}    FHS-7: No flowsheet data found.  {If any of the questions to the BCRA (FHS-7) are answered yes, consider ordering referral for genetic counseling (Optional) :251029::\"click delete button to remove this line now\"}  {AMB Mammogram Decision Support (Optional) :274682}  Pertinent mammograms are reviewed under the imaging tab.    Pertinent mammograms are reviewed under the imaging tab.  History of abnormal Pap smear: {PAP HX:787337}  PAP / HPV 7/10/2018   PAP (Historical) NIL     Reviewed and updated as needed this visit by clinical staff                  Reviewed and updated as needed this visit by Provider                 {HISTORY OPTIONS (Optional):713541}    ROS:  { :437977}    OBJECTIVE:   There were no vitals taken for this visit.  EXAM:  {Exam Choices:220538}    {Diagnostic Test Results (Optional):305483::\"Diagnostic Test Results:\",\"Labs reviewed in Epic\"}    ASSESSMENT/PLAN:   {Diag Picklist:125692}    Patient has been advised of split billing requirements and indicates understanding: {YES / NO:999741::\"Yes\"}  COUNSELING:   {FEMALE COUNSELING MESSAGES:254955::\"Reviewed preventive health counseling, as reflected in patient instructions\"}    Estimated body mass index is 45.59 kg/m  as calculated from the following:    Height as of 10/25/19: 1.702 m (5' 7\").    Weight as of 10/25/19: 132 kg (291 lb 1.6 oz).    {Weight Management Plan (ACO) Complete if BMI is abnormal-  Ages 18-64  BMI >24.9.  Age 65+ with BMI <23 or >30 (Optional):850482}    She reports that she has never smoked. She has never used smokeless tobacco.      Counseling Resources:  ATP IV Guidelines  Pooled Cohorts Equation " Calculator  Breast Cancer Risk Calculator  BRCA-Related Cancer Risk Assessment: FHS-7 Tool  FRAX Risk Assessment  ICSI Preventive Guidelines  Dietary Guidelines for Americans, 2010  USDA's MyPlate  ASA Prophylaxis  Lung CA Screening    Cruzito Edouard MD  [facility]

## 2022-04-07 ENCOUNTER — LAB (OUTPATIENT)
Dept: LAB | Facility: CLINIC | Age: 27
End: 2022-04-07

## 2022-04-07 ENCOUNTER — OFFICE VISIT (OUTPATIENT)
Dept: FAMILY MEDICINE | Facility: CLINIC | Age: 27
End: 2022-04-07
Payer: COMMERCIAL

## 2022-04-07 VITALS
HEIGHT: 66 IN | WEIGHT: 209 LBS | OXYGEN SATURATION: 100 % | TEMPERATURE: 98.3 F | DIASTOLIC BLOOD PRESSURE: 85 MMHG | SYSTOLIC BLOOD PRESSURE: 128 MMHG | HEART RATE: 75 BPM | BODY MASS INDEX: 33.59 KG/M2

## 2022-04-07 DIAGNOSIS — Z00.00 ROUTINE HISTORY AND PHYSICAL EXAMINATION OF ADULT: ICD-10-CM

## 2022-04-07 DIAGNOSIS — E66.01 MORBID OBESITY (H): ICD-10-CM

## 2022-04-07 DIAGNOSIS — Z12.4 SCREENING FOR MALIGNANT NEOPLASM OF CERVIX: ICD-10-CM

## 2022-04-07 DIAGNOSIS — F33.42 MAJOR DEPRESSIVE DISORDER, RECURRENT, IN FULL REMISSION (H): ICD-10-CM

## 2022-04-07 DIAGNOSIS — Z00.00 ROUTINE HISTORY AND PHYSICAL EXAMINATION OF ADULT: Primary | ICD-10-CM

## 2022-04-07 PROCEDURE — 84443 ASSAY THYROID STIM HORMONE: CPT

## 2022-04-07 PROCEDURE — G0145 SCR C/V CYTO,THINLAYER,RESCR: HCPCS | Performed by: FAMILY MEDICINE

## 2022-04-07 PROCEDURE — 80053 COMPREHEN METABOLIC PANEL: CPT

## 2022-04-07 PROCEDURE — 99395 PREV VISIT EST AGE 18-39: CPT | Performed by: FAMILY MEDICINE

## 2022-04-07 PROCEDURE — 36415 COLL VENOUS BLD VENIPUNCTURE: CPT

## 2022-04-07 NOTE — PROGRESS NOTES
SUBJECTIVE:   CC: Danielle Do is an 27 year old woman who presents for preventive health visit.       Patient has been advised of split billing requirements and indicates understanding: Yes  Healthy Habits:     Getting at least 3 servings of Calcium per day:  Yes    Bi-annual eye exam:  Yes    Dental care twice a year:  Yes    Sleep apnea or symptoms of sleep apnea:  None    Diet:  Regular (no restrictions)    Frequency of exercise:  None    Taking medications regularly:  Yes    Medication side effects:  Not applicable    PHQ-2 Total Score: 0    Additional concerns today:  No          Depression Followup    How are you doing with your depression since your last visit? Improved off med    Are you having other symptoms that might be associated with depression? No    Have you had a significant life event?  Relationship Concerns     Are you feeling anxious or having panic attacks?   No    Do you have any concerns with your use of alcohol or other drugs? No    Social History     Tobacco Use     Smoking status: Never Smoker     Smokeless tobacco: Never Used   Substance Use Topics     Alcohol use: Yes     Comment: 2 per week      Drug use: No     PHQ 8/5/2020 8/6/2020 8/2/2021   PHQ-9 Total Score 10 12 6   Q9: Thoughts of better off dead/self-harm past 2 weeks Several days Not at all Not at all   F/U: Thoughts of suicide or self-harm Yes - -   F/U: Self harm-plan No - -   F/U: Self-harm action No - -   F/U: Safety concerns No - -     ANNETTA-7 SCORE 8/5/2020   Total Score 2 (minimal anxiety)   Total Score 2     Last PHQ-9 4/8/2022   1.  Little interest or pleasure in doing things 0   2.  Feeling down, depressed, or hopeless 0   3.  Trouble falling or staying asleep, or sleeping too much 1   4.  Feeling tired or having little energy 1   5.  Poor appetite or overeating 0   6.  Feeling bad about yourself 1   7.  Trouble concentrating 0   8.  Moving slowly or restless 0   Q9: Thoughts of better off dead/self-harm past 2  weeks 0   PHQ-9 Total Score 3   Difficulty at work, home, or with people Not difficult at all   In the past two weeks have you had thoughts of suicide or self harm? -   Do you have concerns about your personal safety or the safety of others? -   In the past 2 weeks have you thought about a plan or had intention to harm yourself? -   In the past 2 weeks have you acted on these thoughts in any way? -       Suicide Assessment Five-step Evaluation and Treatment (SAFE-T)      Today's PHQ-2 Score:   PHQ-2 ( 1999 Pfizer) 4/7/2022   Q1: Little interest or pleasure in doing things 0   Q2: Feeling down, depressed or hopeless 0   PHQ-2 Score 0   PHQ-2 Total Score (12-17 Years)- Positive if 3 or more points; Administer PHQ-A if positive -   Q1: Little interest or pleasure in doing things Not at all   Q2: Feeling down, depressed or hopeless Not at all   PHQ-2 Score 0       Abuse: Current or Past (Physical, Sexual or Emotional) - No  Do you feel safe in your environment? Yes    Have you ever done Advance Care Planning? (For example, a Health Directive, POLST, or a discussion with a medical provider or your loved ones about your wishes): No, advance care planning information given to patient to review.  Patient declined advance care planning discussion at this time.    Social History     Tobacco Use     Smoking status: Never Smoker     Smokeless tobacco: Never Used   Substance Use Topics     Alcohol use: Yes     Comment: 2 per week      If you drink alcohol do you typically have >3 drinks per day or >7 drinks per week? No    Alcohol Use 4/7/2022   Prescreen: >3 drinks/day or >7 drinks/week? No   No flowsheet data found.    Roomed by NOREEN Schulz      Reviewed orders with patient.  Reviewed health maintenance and updated orders accordingly - Yes  Lab work is in process    Breast Cancer Screening:    FHS-7:   Breast CA Risk Assessment (FHS-7) 4/7/2022   Did any of your first-degree relatives have breast or ovarian cancer?  Unknown   Did any of your relatives have bilateral breast cancer? Yes   Did any man in your family have breast cancer? No   Did any woman in your family have breast and ovarian cancer? Yes   Did any woman in your family have breast cancer before age 50 y? No   Do you have 2 or more relatives with breast and/or ovarian cancer? Yes   Do you have 2 or more relatives with breast and/or bowel cancer? Yes       Patient under 40 years of age: Routine Mammogram Screening not recommended.   Pertinent mammograms are reviewed under the imaging tab.    History of abnormal Pap smear: NO - age 30-65 PAP every 5 years with negative HPV co-testing recommended  PAP / HPV 7/10/2018   PAP (Historical) NIL     Reviewed and updated as needed this visit by clinical staff                  Reviewed and updated as needed this visit by Provider                 No past medical history on file.     Review of Systems  CONSTITUTIONAL: NEGATIVE for fever, chills, change in weight  INTEGUMENTARU/SKIN: NEGATIVE for worrisome rashes, moles or lesions  EYES: NEGATIVE for vision changes or irritation  ENT: NEGATIVE for ear, mouth and throat problems  RESP: NEGATIVE for significant cough or SOB  BREAST: NEGATIVE for masses, tenderness or discharge  CV: NEGATIVE for chest pain, palpitations or peripheral edema  GI: NEGATIVE for nausea, abdominal pain, heartburn, or change in bowel habits  : NEGATIVE for unusual urinary or vaginal symptoms. Periods are regular.  MUSCULOSKELETAL: NEGATIVE for significant arthralgias or myalgia  NEURO: NEGATIVE for weakness, dizziness or paresthesias  PSYCHIATRIC: NEGATIVE for changes in mood or affect     OBJECTIVE:   There were no vitals taken for this visit.  Physical Exam  GENERAL: healthy, alert and no distress  EYES: Eyes grossly normal to inspection, PERRL and conjunctivae and sclerae normal  HENT: ear canals and TM's normal, nose and mouth without ulcers or lesions  NECK: no adenopathy, no asymmetry, masses, or  scars and thyroid normal to palpation  RESP: lungs clear to auscultation - no rales, rhonchi or wheezes  BREAST: normal without masses, tenderness or nipple discharge and no palpable axillary masses or adenopathy  CV: regular rate and rhythm, normal S1 S2, no S3 or S4, no murmur, click or rub, no peripheral edema and peripheral pulses strong  ABDOMEN: soft, nontender, no hepatosplenomegaly, no masses and bowel sounds normal   (female): normal female external genitalia, normal urethral meatus, vaginal mucosa, normal cervix/adnexa/uterus without masses or discharge  MS: no gross musculoskeletal defects noted, no edema  SKIN: no suspicious lesions or rashes  NEURO: Normal strength and tone, mentation intact and speech normal  PSYCH: mentation appears normal, affect normal/bright  LYMPH: no cervical, supraclavicular, axillary, or inguinal adenopathy    Diagnostic Test Results:  Labs reviewed in Epic  No results found for any visits on 04/07/22.    ASSESSMENT/PLAN:   (Z00.00) Routine history and physical examination of adult  (primary encounter diagnosis)  Comment: ovetrall well  Plan: Pap screen reflex to HPV if ASCUS - recommend         age 25 - 29, TSH with free T4 reflex,         Comprehensive metabolic panel (BMP + Alb, Alk         Phos, ALT, AST, Total. Bili, TP)            (Z12.4) Screening for malignant neoplasm of cervix  Comment: sent  Plan: Pap screen reflex to HPV if ASCUS - recommend         age 25 - 29        She declined STI tests    (E66.01) Morbid obesity (H)  Comment: reviewed diet/ exercise  Plan: Follow up in 6 months.    (F33.42) Major depressive disorder, recurrent, in full remission (H)  Comment: Well controlled   Plan: walk daily        COUNSELING:  Reviewed preventive health counseling, as reflected in patient instructions       Regular exercise       Healthy diet/nutrition       Vision screening       Hearing screening       Contraception       Family planning       Osteoporosis  "prevention/bone health       Safe sex practices/STD prevention    Estimated body mass index is 45.59 kg/m  as calculated from the following:    Height as of 10/25/19: 1.702 m (5' 7\").    Weight as of 10/25/19: 132 kg (291 lb 1.6 oz).    Weight management plan: Discussed healthy diet and exercise guidelines    She reports that she has never smoked. She has never used smokeless tobacco.      Counseling Resources:  ATP IV Guidelines  Pooled Cohorts Equation Calculator  Breast Cancer Risk Calculator  BRCA-Related Cancer Risk Assessment: FHS-7 Tool  FRAX Risk Assessment  ICSI Preventive Guidelines  Dietary Guidelines for Americans, 2010  USDA's MyPlate  ASA Prophylaxis  Lung CA Screening    Cruzito Edouard MD  [facility]  "

## 2022-04-08 PROBLEM — F33.42 MAJOR DEPRESSIVE DISORDER, RECURRENT, IN FULL REMISSION (H): Status: ACTIVE | Noted: 2022-04-08

## 2022-04-08 LAB
ALBUMIN SERPL-MCNC: 3.9 G/DL (ref 3.4–5)
ALP SERPL-CCNC: 69 U/L (ref 40–150)
ALT SERPL W P-5'-P-CCNC: 44 U/L (ref 0–50)
ANION GAP SERPL CALCULATED.3IONS-SCNC: 5 MMOL/L (ref 3–14)
AST SERPL W P-5'-P-CCNC: 20 U/L (ref 0–45)
BILIRUB SERPL-MCNC: 0.4 MG/DL (ref 0.2–1.3)
BUN SERPL-MCNC: 16 MG/DL (ref 7–30)
CALCIUM SERPL-MCNC: 8.5 MG/DL (ref 8.5–10.1)
CHLORIDE BLD-SCNC: 106 MMOL/L (ref 94–109)
CO2 SERPL-SCNC: 28 MMOL/L (ref 20–32)
CREAT SERPL-MCNC: 0.74 MG/DL (ref 0.52–1.04)
GFR SERPL CREATININE-BSD FRML MDRD: >90 ML/MIN/1.73M2
GLUCOSE BLD-MCNC: 92 MG/DL (ref 70–99)
POTASSIUM BLD-SCNC: 3.7 MMOL/L (ref 3.4–5.3)
PROT SERPL-MCNC: 7.3 G/DL (ref 6.8–8.8)
SODIUM SERPL-SCNC: 139 MMOL/L (ref 133–144)
TSH SERPL DL<=0.005 MIU/L-ACNC: 1.2 MU/L (ref 0.4–4)

## 2022-04-08 ASSESSMENT — PATIENT HEALTH QUESTIONNAIRE - PHQ9: SUM OF ALL RESPONSES TO PHQ QUESTIONS 1-9: 3

## 2022-04-11 LAB
BKR LAB AP GYN ADEQUACY: NORMAL
BKR LAB AP GYN INTERPRETATION: NORMAL
BKR LAB AP HPV REFLEX: NORMAL
BKR LAB AP LMP: NORMAL
BKR LAB AP PREVIOUS ABNORMAL: NORMAL
PATH REPORT.COMMENTS IMP SPEC: NORMAL
PATH REPORT.COMMENTS IMP SPEC: NORMAL
PATH REPORT.RELEVANT HX SPEC: NORMAL

## 2022-10-22 ENCOUNTER — HEALTH MAINTENANCE LETTER (OUTPATIENT)
Age: 27
End: 2022-10-22

## 2022-11-02 ENCOUNTER — E-VISIT (OUTPATIENT)
Dept: FAMILY MEDICINE | Facility: CLINIC | Age: 27
End: 2022-11-02
Payer: COMMERCIAL

## 2022-11-02 DIAGNOSIS — B96.89 ACUTE BACTERIAL SINUSITIS: Primary | ICD-10-CM

## 2022-11-02 DIAGNOSIS — J01.90 ACUTE BACTERIAL SINUSITIS: Primary | ICD-10-CM

## 2022-11-02 PROCEDURE — 99421 OL DIG E/M SVC 5-10 MIN: CPT | Performed by: FAMILY MEDICINE

## 2022-11-02 RX ORDER — AZITHROMYCIN 250 MG/1
TABLET, FILM COATED ORAL
Qty: 6 TABLET | Refills: 0 | Status: SHIPPED | OUTPATIENT
Start: 2022-11-02 | End: 2022-11-07

## 2022-11-02 NOTE — PATIENT INSTRUCTIONS
Dear Danielle Do    After reviewing your responses, I've been able to diagnose you with?a sinus infection caused by bacteria.?     Based on your responses and diagnosis, I have prescribed zithromax to treat your symptoms. I have sent this to your pharmacy.?     It is also important to stay well hydrated, get lots of rest and take over-the-counter decongestants,?tylenol?or ibuprofen if you?are able to?take those medications per your primary care provider to help relieve discomfort.?     It is important that you take?all of?your prescribed medication even if your symptoms are improving after a few doses.? Taking?all of?your medicine helps prevent the symptoms from returning.?     If your symptoms worsen, you develop severe headache, vomiting, high fever (>102), or are not improving in 7 days, please contact your primary care provider for an appointment or visit any of our convenient Walk-in Care or Urgent Care Centers to be seen which can be found on our website?here.?     Thanks again for choosing?us?as your health care partner,?   ?  Cruzito Edouard MD?

## 2022-11-02 NOTE — TELEPHONE ENCOUNTER
Encounter Diagnosis   Name Primary?     Acute bacterial sinusitis Yes       Orders Placed This Encounter     azithromycin (ZITHROMAX) 250 MG tablet     Sig: Take 2 tablets (500 mg) by mouth daily for 1 day, THEN 1 tablet (250 mg) daily for 4 days.     Dispense:  6 tablet     Refill:  0     Provider E-Visit time total (minutes): 8

## 2023-01-04 ENCOUNTER — E-VISIT (OUTPATIENT)
Dept: FAMILY MEDICINE | Facility: CLINIC | Age: 28
End: 2023-01-04
Payer: COMMERCIAL

## 2023-01-04 DIAGNOSIS — B96.89 ACUTE BACTERIAL SINUSITIS: Primary | ICD-10-CM

## 2023-01-04 DIAGNOSIS — J01.90 ACUTE BACTERIAL SINUSITIS: Primary | ICD-10-CM

## 2023-01-04 PROCEDURE — 99421 OL DIG E/M SVC 5-10 MIN: CPT | Performed by: FAMILY MEDICINE

## 2023-01-04 RX ORDER — CEFDINIR 300 MG/1
300 CAPSULE ORAL 2 TIMES DAILY
Qty: 14 CAPSULE | Refills: 0 | Status: SHIPPED | OUTPATIENT
Start: 2023-01-04 | End: 2023-01-11

## 2023-01-04 NOTE — PATIENT INSTRUCTIONS
Sinusitis (Antibiotic Treatment)    The sinuses are air-filled spaces within the bones of the face. They connect to the inside of the nose. Sinusitis is an inflammation of the tissue that lines the sinuses. Sinusitis can occur during a cold. It can also happen due to allergies to pollens and other particles in the air. Sinusitis can cause symptoms of sinus congestion and a feeling of fullness. A sinus infection causes fever, headache, and facial pain. There is often green or yellow fluid draining from the nose or into the back of the throat (post-nasal drip). You have been given antibiotics to treat this condition.   Home care    Take the full course of antibiotics as instructed. Don't stop taking them, even when you feel better.    Drink plenty of water, hot tea, and other liquids as directed by the healthcare provider. This may help thin nasal mucus. It also may help your sinuses drain fluids.    Heat may help soothe painful areas of your face. Use a towel soaked in hot water. Or,  the shower and direct the warm spray onto your face. Using a vaporizer along with a menthol rub at night may also help soothe symptoms.     An expectorant with guaifenesin may help thin nasal mucus and help your sinuses drain fluids. Talk with your provider or pharmacists before taking an over-the-counter (OTC) medicine if you have any questions about it or its side effects..    You can use an OTC decongestant, unless a similar medicine was prescribed to you. Nasal sprays work the fastest. Use one that contains phenylephrine or oxymetazoline. First blow your nose gently. Then use the spray. Don't use these medicines more often than directed on the label. If you do, your symptoms may get worse. You may also take pills that contain pseudoephedrine. Don t use products that combine multiple medicines. This is because side effects may be increased. Read labels. You can also ask the pharmacist for help. (People with high blood  pressure should not use decongestants. They can raise blood pressure.) Talk with your provider or pharmacist if you have any questions about the medicine..    OTC antihistamines may help if allergies contributed to your sinusitis. Talk with your provider or pharmacist if you have any questions about the medicine..    Don't use nasal rinses or irrigation during an acute sinus infection, unless your healthcare provider tells you to. Rinsing may spread the infection to other areas in your sinuses.    Use acetaminophen or ibuprofen to control pain, unless another pain medicine was prescribed to you. If you have chronic liver or kidney disease or ever had a stomach ulcer, talk with your healthcare provider before using these medicines. Never give aspirin to anyone under age 18 who is ill with a fever. It may cause severe liver damage.    Don't smoke. This can make symptoms worse.    Follow-up care  Follow up with your healthcare provider, or as advised.   When to seek medical advice  Call your healthcare provider if any of these occur:     Facial pain or headache that gets worse    Stiff neck    Unusual drowsiness or confusion    Swelling of your forehead or eyelids    Symptoms don't go away in 10 days    Vision problems, such as blurred or double vision    Fever of 100.4 F (38 C) or higher, or as directed by your healthcare provider  Call 911  Call 911 if any of these occur:     Seizure    Trouble breathing    Feeling dizzy or faint    Fingernails, skin or lips look blue, purple , or gray  Prevention  Here are steps you can take to help prevent an infection:     Keep good hand washing habits.    Don t have close contact with people who have sore throats, colds, or other upper respiratory infections.    Don t smoke, and stay away from secondhand smoke.    Stay up to date with of your vaccines.  [employer] last reviewed this educational content on 12/1/2019 2000-2021 [employer]. All rights reserved. This  information is not intended as a substitute for professional medical care. Always follow your healthcare professional's instructions.        Dear Danielle Do    After reviewing your responses, I've been able to diagnose you with Acute bacterial sinusitis.      Based on your responses and diagnosis, I have prescribed No orders of the defined types were placed in this encounter.   to treat your symptoms. I have sent this to your pharmacy.?     It is also important to stay well hydrated, get lots of rest and take over-the-counter decongestants,?tylenol?or ibuprofen if you?are able to?take those medications per your primary care provider to help relieve discomfort.?     It is important that you take?all of?your prescribed medication even if your symptoms are improving after a few doses.? Taking?all of?your medicine helps prevent the symptoms from returning.?     If your symptoms worsen, you develop severe headache, vomiting, high fever (>102), or are not improving in 7 days, please contact your primary care provider for an appointment or visit any of our convenient Walk-in Care or Urgent Care Centers to be seen which can be found on our website?here.?     Thanks again for choosing?us?as your health care partner,?   ?  Curtis Novak MD?

## 2023-02-28 ENCOUNTER — MYC MEDICAL ADVICE (OUTPATIENT)
Dept: FAMILY MEDICINE | Facility: CLINIC | Age: 28
End: 2023-02-28
Payer: COMMERCIAL

## 2023-02-28 DIAGNOSIS — U07.1 INFECTION DUE TO 2019 NOVEL CORONAVIRUS: Primary | ICD-10-CM

## 2023-02-28 NOTE — TELEPHONE ENCOUNTER
RN COVID TREATMENT VISIT  02/28/23      The patient has been triaged and does not require a higher level of care.    Danielle Do  27 year old  Current weight? 230    Has the patient been seen by a primary care provider at an [facility] or [facility] within the past two years? Yes.   Have you been in close proximity to/do you have a known exposure to a person with a confirmed case of influenza? No.     General treatment eligibility:  Date of positive COVID test (PCR or at home)?  2/28/23    Are you or have you been hospitalized for this COVID-19 infection? No.   Have you received monoclonal antibodies or antiviral treatment for COVID-19 since this positive test? No.   Do you have any of the following conditions that place you at risk of being very sick from COVID-19?   - Mental health disorders including mood disorders, depression, schizophrenia spectrum disorders   - Overweight or Obesity (BMI >85th percentile or BMI 25 or higher)  Yes, patient has at least one high risk condition as noted above.     Current COVID symptoms:   - fever or chills  - cough  - shortness of breath or difficulty breathing  - fatigue  - muscle or body aches  - headache  - sore throat  - congestion or runny nose  Yes. Patient has at least one symptom as selected.     How many days since symptoms started? 5 days or less. Established patient, 12 years or older weighing at least 88.2 lbs, who has symptoms that started in the past 5 days, has not been hospitalized nor received treatment already, and is at risk for being very sick from COVID-19.     Treatment eligibility by RN:    Are you currently pregnant or nursing? No    Do you have a clinically significant hypersensitivity to nirmatrelvir or ritonavir, or toxic epidermal necrolysis (TEN) or Whitehead-Curry Syndrome? No    Do you have a history of hepatitis, any hepatic impairment on the Problem List (such as Child-Rosario Class C, cirrhosis, fatty liver  disease, alcoholic liver disease), or was the last liver lab (hepatic panel, ALT, AST, ALK Phos, bilirubin) elevated in the past 6 months? No    Do you have any history of severe renal impairment (eGFR < 30mL/min)? No    Is patient eligible to continue?   Yes, patient meets all eligibility requirements for the RN COVID treatment (as denoted by all no responses above).     Current Outpatient Medications   Medication Sig Dispense Refill     TYLENOL EXTRA STRENGTH 500 MG OR TABS as directed       Pt also is taking birth control med    Medications from List 1 of the standing order (on medications that exclude the use of Paxlovid) that patient is taking: NONE. Is patient taking Ashley's Wort? No  Is patient taking [place]'s Wort or any meds from List 1? No.   Medications from List 2 of the standing order (on meds that provider needs to adjust) that patient is taking: NONE. Is patient on any of the meds from List 2? No.   Medications from List 3 of standing order (on meds that a RN needs to adjust) that patient is taking: NONE. Is patient on any meds from List 3? No.     Paxlovid has an approximate 90% reduction in hospitalization. Paxlovid can possibly cause altered sense of taste, diarrhea (loose, watery stools), high blood pressure, muscle aches.     Would patient like a Paxlovid prescription?   Yes.   Lab Results   Component Value Date    GFRESTIMATED >90 04/07/2022       Was last eGFR reduced? No, eGFR 60 or greater/ No Result on record. Patient can receive the normal renal function dose. Paxlovid Rx sent to [facility] pharmacy   [facility] [phone]  [address], [address]  [place] [address]    Hours:  Mon-Fri: 7:00a - 7:00p    Drive-thru services available.    Temporary change to home medications: None    All medication adjustments (holds, etc) were discussed with the patient and patient was asked to repeat back (teachback) their med adjustment.  Did patient understand med adjustment? No  medication adjustments needed.         Reviewed the following instructions with the patient:    Paxlovid (nimatrelvir and ritonavir)    How it works  Two medicines (nirmatrelvir and ritonavir) are taken together. They stop the virus from growing. Less amount of virus is easier for your body to fight.    How to take    Medicine comes in a daily container with both medicine tablets. Take by mouth twice daily (once in the morning, once at night) for 5 days.    The number of tablets to take varies by patient.    Don't chew or break capsules. Swallow whole.    When to take  Take as soon as possible after positive COVID-19 test result, and within 5 days of your first symptoms.    Possible side effects  Can cause altered sense of taste, diarrhea (loose, watery stools), high blood pressure, muscle aches.    Anneliese Fernandez RN

## 2023-06-01 ENCOUNTER — HEALTH MAINTENANCE LETTER (OUTPATIENT)
Age: 28
End: 2023-06-01

## 2024-06-02 ENCOUNTER — HEALTH MAINTENANCE LETTER (OUTPATIENT)
Age: 29
End: 2024-06-02

## 2024-07-23 ENCOUNTER — VIRTUAL VISIT (OUTPATIENT)
Dept: OBGYN | Facility: CLINIC | Age: 29
End: 2024-07-23
Payer: COMMERCIAL

## 2024-07-23 DIAGNOSIS — Z34.00 SUPERVISION OF NORMAL FIRST PREGNANCY, ANTEPARTUM: Primary | ICD-10-CM

## 2024-07-23 PROCEDURE — 99207 PR NO CHARGE NURSE ONLY: CPT | Mod: 93

## 2024-07-23 ASSESSMENT — PATIENT HEALTH QUESTIONNAIRE - PHQ9: SUM OF ALL RESPONSES TO PHQ QUESTIONS 1-9: 6

## 2024-07-23 NOTE — PROGRESS NOTES
Telephone visit with patient for New Prenatal Intake and Education. This is patient's first pregnancy. Handouts reviewed and will be provided at next prenatal appointment. Scheduled for New Prenatal with Dr. Neely on 8/21/2024.        Prenatal OB Questionnaire  Patient supplied answers from flow sheet for:  Prenatal OB Questionnaire.  Past Medical History  Have you ever recieved care for your mental health? : (!) Yes  Have you ever been in a major accident or suffered serious trauma?: No  Within the last year, has anyone hit, slapped, kicked or otherwise hurt you?: No  In the last year, has anyone forced you to have sex when you didn't want to?: No    Past Medical History 2   Have you ever received a blood transfusion?: No  Would you accept a blood transfusion if was medically recommended?: Yes  Does anyone in your home smoke?: No   Is your blood type Rh negative?: (!) Yes  Have you ever ?: No  Have you been hospitalized for a nonsurgical reason excluding normal delivery?: No  Have you ever had an abnormal pap smear?: No    Past Medical History (Continued)  Do you have a history of abnormalities of the uterus?: No  Did your mother take NAMRATA or any other hormones when she was pregnant with you?: No  Do you have any other problems we have not asked about which you feel may be important to this pregnancy?: No    PHQ-9 score:        7/23/2024     8:01 AM   PHQ   PHQ-9 Total Score 6   Q9: Thoughts of better off dead/self-harm past 2 weeks Not at all           Allergies as of 7/23/2024:    Allergies as of 07/23/2024 - Reviewed 07/23/2024   Allergen Reaction Noted    Amoxicillin  02/04/2007       Current medications are:  Current Outpatient Medications   Medication Sig Dispense Refill    Prenatal Vit-Fe Fumarate-FA (PRENATAL MULTIVITAMIN  PLUS IRON) 27-1 MG TABS Take by mouth daily      TYLENOL EXTRA STRENGTH 500 MG OR TABS as directed (Patient not taking: Reported on 7/23/2024)

## 2024-07-23 NOTE — PATIENT INSTRUCTIONS
Learning About Pregnancy  Your Care Instructions     Your health in the early weeks of your pregnancy is particularly important for your baby's health. Take good care of yourself. Anything you do that harms your body can also harm your baby.  Make sure to go to all of your doctor appointments. Regular checkups will help keep you and your baby healthy.  How can you care for yourself at home?  Diet    Choose healthy foods like fruits, vegetables, whole grains, lean proteins, and healthy fats.     Choose foods that are good sources of calcium, iron, and folate. You can try dairy products, dark leafy greens, fortified orange juice and cereals, almonds, broccoli, dried fruit, and beans.     Do not skip meals or go for many hours without eating. If you are nauseated, try to eat a small, healthy snack every 2 to 3 hours.     Avoid fish that are high in mercury. These include shark, swordfish, stephenie mackerel, marlin, orange roughy, and bigeye tuna, as well as tilefish from the [facility].     It's okay to eat up to 8 to 12 ounces a week of fish that are low in mercury or up to 4 ounces a week of fish that have medium levels of mercury. Some fish that are low in mercury are salmon, shrimp, canned light tuna, cod, and tilapia. Some fish that have medium levels of mercury are halibut and white albacore tuna.     Drink plenty of fluids. If you have kidney, heart, or liver disease and have to limit fluids, talk with your doctor before you increase the amount of fluids you drink.     Limit caffeine to about 200 to 300 mg per day. On average, a cup of brewed coffee has around 80 to 100 mg of caffeine.     Do not drink alcohol, such as beer, wine, or hard liquor.     Take a multivitamin that contains at least 400 micrograms (mcg) of folic acid to help prevent birth defects. Fortified cereal and whole wheat bread are good additional sources of folic acid.     Increase the calcium in your diet. Try to drink a quart of skim milk  each day. You may also take calcium supplements and choose foods such as cheese and yogurt.   Lifestyle    Make sure you go to your follow-up appointments.     Get plenty of rest. You may be unusually tired while you are pregnant.     Get at least 30 minutes of exercise on most days of the week. Walking is a good choice. If you have not exercised in the past, start out slowly. Take several short walks each day.     Do not smoke. If you need help quitting, talk to your doctor about stop-smoking programs. These can increase your chances of quitting for good.     Do not touch cat feces or litter boxes. Also, wash your hands after you handle raw meat, and fully cook all meat before you eat it. Wear gloves when you work in the yard or garden, and wash your hands well when you are done. Cat feces, raw or undercooked meat, and contaminated dirt can cause an infection that may harm your baby or lead to a miscarriage.     Avoid things that can make your body too hot and may be harmful to your baby, such as a hot tub or sauna. Or talk with your doctor before doing anything that raises your body temperature. Your doctor can tell you if it's safe.     Avoid chemical fumes, paint fumes, or poisons.     Do not use illegal drugs, marijuana, or alcohol.   Medicines    Review all of your medicines with your doctor. Some of your routine medicines may need to be changed to protect your baby.     Use acetaminophen (Tylenol) to relieve minor problems, such as a mild headache or backache or a mild fever with cold symptoms. Do not use nonsteroidal anti-inflammatory drugs (NSAIDs), such as ibuprofen (Advil, Motrin) or naproxen (Aleve), unless your doctor says it is okay.     Do not take two or more pain medicines at the same time unless the doctor told you to. Many pain medicines have acetaminophen, which is Tylenol. Too much acetaminophen (Tylenol) can be harmful.     Take your medicines exactly as prescribed. Call your doctor if you  "think you are having a problem with your medicine.   To manage morning sickness    Keep food in your stomach, but not too much at once. Try eating five or six small meals a day instead of three large meals.     For nausea when you wake up, eat a small snack, such as a couple of crackers or pretzels, before rising. Allow a few minutes for your stomach to settle before you slowly get up.     Try to avoid smells and foods that make you feel nauseated. High-fat or greasy foods, milk, and coffee may make nausea worse. Some foods that may be easier to tolerate include cold, spicy, sour, and salty foods.     Drink enough fluids. Water and other caffeine-free drinks are good choices.     Take your prenatal vitamins at night on a full stomach.     Try foods and drinks made with sondra. Sondra may help with nausea.     Get lots of rest. Morning sickness may be worse when you are tired.     Talk to your doctor about over-the-counter products, such as vitamin B6 or doxylamine, to help relieve symptoms.     Try a P6 acupressure wrist band. These anti-nausea wristbands help some people.   Follow-up care is a key part of your treatment and safety. Be sure to make and go to all appointments, and call your doctor if you are having problems. It's also a good idea to know your test results and keep a list of the medicines you take.  Where can you learn more?  Go to https://www.Hemosphere.net/patiented  Enter E868 in the search box to learn more about \"Learning About Pregnancy.\"  Current as of: July 10, 2023               Content Version: 14.0    2850-3127 [employer].   Care instructions adapted under license by your healthcare professional. If you have questions about a medical condition or this instruction, always ask your healthcare professional. [employer] disclaims any warranty or liability for your use of this information.      Weeks 6 to 10 of Your Pregnancy: Care Instructions  During these weeks of " "pregnancy, your body goes through many changes. You may start to feel different, both in your body and your emotions. Each pregnancy is different, so there's no \"right\" way to feel. These early weeks are a time to make healthy choices for you and your pregnancy.    Take a daily prenatal vitamin. Choose one with folic acid in it.    Avoid alcohol, tobacco, and drugs (including marijuana). If you need help quitting, talk to your doctor.    Drink plenty of liquids.  Be sure to drink enough water. And limit sodas, other sweetened drinks, and caffeine.     Choose foods that are good sources of calcium, iron, and folate.  You can try dairy products, dark leafy greens, fortified orange juice and cereals, almonds, broccoli, dried fruit, and beans.     Avoid foods that may be harmful.  Don't eat raw meat, deli meat, raw seafood, or raw eggs. Avoid soft cheese and unpasteurized dairy, like Brie and blue cheese. And don't eat fish that contains a lot of mercury, like shark and swordfish.     Don't touch eder litter or cat poop.  They can cause an infection that could be harmful during pregnancy.     Avoid things that can make your body too hot.  For example, avoid hot tubs and saunas.     Soothe morning sickness.  Try eating 5 or 6 small meals a day, getting some fresh air, or using julian to control symptoms.     Ask your doctor about flu and COVID-19 shots.  Getting them can help protect against infection.   Follow-up care is a key part of your treatment and safety. Be sure to make and go to all appointments, and call your doctor if you are having problems. It's also a good idea to know your test results and keep a list of the medicines you take.  Where can you learn more?  Go to https://www.to-BBB.net/patiented  Enter G112 in the search box to learn more about \"Weeks 6 to 10 of Your Pregnancy: Care Instructions.\"  Current as of: July 10, 2023               Content Version: 14.0    0835-2704 [employer]. "   Care instructions adapted under license by your healthcare professional. If you have questions about a medical condition or this instruction, always ask your healthcare professional. [employer] disclaims any warranty or liability for your use of this information.         Managing Morning Sickness (01:55)  Your health professional recommends that you watch this short online health video.  Learn tips for dealing with morning sickness, no matter what time of day you have it.  Purpose:  Gives tips for managing morning sickness, including eating small low-fat meals and avoiding caffeine and spicy food.  Goal:  The user will learn tips for dealing with morning sickness during pregnancy.     How to watch the video    Scan the QR code   OR Visit the website    [email or web address]   Current as of: July 10, 2023               Content Version: 14.0    7266-9459 [employer].   Care instructions adapted under license by your healthcare professional. If you have questions about a medical condition or this instruction, always ask your healthcare professional. [employer] disclaims any warranty or liability for your use of this information.      Pregnancy and Heartburn: Care Instructions  Overview     Heartburn is a common problem during pregnancy.  Heartburn happens when stomach acid backs up into the tube that carries food to the stomach. This tube is called the esophagus. Early in pregnancy, heartburn is caused by hormone changes that slow down digestion. Later on, it's also caused by the large uterus pushing up on the stomach.  Even though you can't fix the cause, there are things you can do to get relief. Treating heartburn during pregnancy focuses first on making lifestyle changes, like changing what and how you eat, and on taking medicines.  Heartburn usually improves or goes away after childbirth.  Follow-up care is a key part of your treatment and safety. Be sure to make  "and go to all appointments, and call your doctor if you are having problems. It's also a good idea to know your test results and keep a list of the medicines you take.  How can you care for yourself at home?  Eat small, frequent meals.  Avoid foods that make your symptoms worse, such as chocolate, peppermint, and spicy foods. Avoid drinks with caffeine, such as coffee, tea, and sodas.  Avoid bending over or lying down after meals.  Take a short walk after you eat.  If heartburn is a problem at night, do not eat for 2 hours before bedtime.  Take antacids like Mylanta, Maalox, Rolaids, or Tums. Do not take antacids that have sodium bicarbonate, magnesium trisilicate, or aspirin. Be careful when you take over-the-counter antacid medicines. Many of these medicines have aspirin in them. While you are pregnant, do not take aspirin or medicines that contain aspirin unless your doctor says it is okay.  If you're not getting relief, talk to your doctor. You may be able to take a stronger acid-reducing medicine.  When should you call for help?   Call your doctor now or seek immediate medical care if:    You have new or worse belly pain.     You are vomiting.   Watch closely for changes in your health, and be sure to contact your doctor if:    You have new or worse symptoms of reflux.     You are losing weight.     You have trouble or pain swallowing.     You do not get better as expected.   Where can you learn more?  Go to https://www.[employer].net/patiented  Enter U946 in the search box to learn more about \"Pregnancy and Heartburn: Care Instructions.\"  Current as of: July 10, 2023               Content Version: 14.0    3589-3311 [employer].   Care instructions adapted under license by your healthcare professional. If you have questions about a medical condition or this instruction, always ask your healthcare professional. [employer] disclaims any warranty or liability for your use of this " information.      Constipation: Care Instructions  Overview     Constipation means that you have a hard time passing stools (bowel movements). People pass stools from 3 times a day to once every 3 days. What is normal for you may be different. Constipation may occur with pain in the rectum and cramping. The pain may get worse when you try to pass stools. Sometimes there are small amounts of bright red blood on toilet paper or the surface of stools. This is because of enlarged veins near the rectum (hemorrhoids).  A few changes in your diet and lifestyle may help you avoid ongoing constipation. Your doctor may also prescribe medicine to help loosen your stool.  Some medicines can cause constipation. These include pain medicines and antidepressants. Tell your doctor about all the medicines you take. Your doctor may want to make a medicine change to ease your symptoms.  Follow-up care is a key part of your treatment and safety. Be sure to make and go to all appointments, and call your doctor if you are having problems. It's also a good idea to know your test results and keep a list of the medicines you take.  How can you care for yourself at home?  Drink plenty of fluids. If you have kidney, heart, or liver disease and have to limit fluids, talk with your doctor before you increase the amount of fluids you drink.  Include high-fiber foods in your diet each day. These include fruits, vegetables, beans, and whole grains.  Get at least 30 minutes of exercise on most days of the week. Walking is a good choice. You also may want to do other activities, such as running, swimming, cycling, or playing tennis or team sports.  Take a fiber supplement, such as Citrucel or Metamucil, every day. Read and follow all instructions on the label.  Schedule time each day for a bowel movement. A daily routine may help. Take your time having a bowel movement, but don't sit for more than 10 minutes at a time. And don't strain too  "much.  Support your feet with a small step stool when you sit on the toilet. This helps flex your hips and places your pelvis in a squatting position.  Your doctor may recommend an over-the-counter laxative to relieve your constipation. Examples are Milk of Magnesia and MiraLax. Read and follow all instructions on the label. Do not use laxatives on a long-term basis.  When should you call for help?   Call your doctor now or seek immediate medical care if:    You have new or worse belly pain.     You have new or worse nausea or vomiting.     You have blood in your stools.   Watch closely for changes in your health, and be sure to contact your doctor if:    Your constipation is getting worse.     You do not get better as expected.   Where can you learn more?  Go to https://www.[employer].net/patiented  Enter P343 in the search box to learn more about \"Constipation: Care Instructions.\"  Current as of: October 19, 2023               Content Version: 14.0    3715-0310 [employer].   Care instructions adapted under license by your healthcare professional. If you have questions about a medical condition or this instruction, always ask your healthcare professional. [employer] disclaims any warranty or liability for your use of this information.      Learning About High-Iron Foods  What foods are high in iron?     The foods you eat contain nutrients, such as vitamins and minerals. Iron is a nutrient. Your body needs the right amount to stay healthy and work as it should. You can use the list below to help you make choices about which foods to eat.  Here are some foods that contain iron. They have 1 to 2 milligrams of iron per serving.  Fruits  Figs (dried), 5 figs  Vegetables  Asparagus (canned), 6 arceo  Valeriano, beet, Swiss chard, or turnip greens, 1 cup  Dried peas, cooked,   cup  Seaweed, spirulina (dried),   cup  Spinach, (cooked)   cup or (raw) 1 cup  Grains  Cereals, fortified with iron, 1 " "cup  Grits (instant, cooked), fortified with iron,   cup  Meats and other protein foods  Beans (kidney, lima, navy, white), canned or cooked,   cup  Beef or lamb, 3 oz  Chicken giblets, 3 oz  Chickpeas (garbanzo beans),   cup  Liver of beef, lamb, or pork, 3 oz  Oysters (cooked), 3 oz  Sardines (canned), 3 oz  Soybeans (boiled),   cup  Tofu (firm),   cup  Work with your doctor to find out how much of this nutrient you need. Depending on your health, you may need more or less of it in your diet.  Where can you learn more?  Go to https://www.Interface21.net/patiented  Enter R005 in the search box to learn more about \"Learning About High-Iron Foods.\"  Current as of: September 20, 2023               Content Version: 14.0    0509-6142 [employer].   Care instructions adapted under license by your healthcare professional. If you have questions about a medical condition or this instruction, always ask your healthcare professional. [employer] disclaims any warranty or liability for your use of this information.      Rh Antibodies Screening During Pregnancy: About This Test  What is it?     The Rh antibodies screening test is a blood test. It checks your blood for Rh antibodies. If you have Rh-negative blood and have been exposed to Rh-positive blood, your immune system may make antibodies to attack the Rh-positive blood. When a pregnant woman has these antibodies, it is called Rh sensitization.  Why is this test done?  The Rh antibodies screening test is done during pregnancy to find out if your baby is at risk for Rh disease. This can happen if you have Rh-negative blood and your baby has Rh-positive blood. If your Rh-negative blood mixes with Rh-positive blood, your immune system will make antibodies to attack the Rh-positive blood.  During pregnancy, these antibodies could attach to the baby's red blood cells. This can cause your baby to have serious health problems. The results of this test " "will help your doctor know how to best care for you and your baby during your pregnancy.  How do you prepare for the test?  In general, there's nothing you have to do before this test, unless your doctor tells you to.  How is the test done?  A health professional uses a needle to take a blood sample, usually from the arm.  What happens after the test?  You will probably be able to go home right away. It depends on the reason for the test.  You can go back to your usual activities right away.  Follow-up care is a key part of your treatment and safety. Be sure to make and go to all appointments, and call your doctor if you are having problems. It's also a good idea to keep a list of the medicines you take. Ask your doctor when you can expect to have your test results.  Where can you learn more?  Go to https://www.Luminate.[employer] Technologies/patiented  Enter P722 in the search box to learn more about \"Rh Antibodies Screening During Pregnancy: About This Test.\"  Current as of: July 10, 2023               Content Version: 14.0    6901-1611 [employer].   Care instructions adapted under license by your healthcare professional. If you have questions about a medical condition or this instruction, always ask your healthcare professional. [employer] disclaims any warranty or liability for your use of this information.      Learning About Preventing Rh Disease  What is Rh disease?     Rh disease can be a serious problem in pregnancy. It happens when substances called antibodies in the mother's blood cause red blood cells in her baby's blood to be destroyed. This can occur when the blood types of a mother and her baby do not match.  All blood has an Rh factor. This is what makes a blood type positive or negative. When you are Rh-negative, your baby may be Rh-negative or Rh-positive. If your baby has Rh-positive blood and it mixes with yours, your body will make antibodies. This is called Rh sensitization.  Most of " "the time, this is not a problem in a first pregnancy. But in future pregnancies, it could cause Rh disease.  A  with Rh disease has mild anemia and may have jaundice. In severe cases, anemia, jaundice, and swelling can be very dangerous or fatal. Some babies need to be delivered early. Some need special care in the NICU. A very sick baby will need a blood transfusion before or after birth.  Fortunately, Rh sensitization is usually easy to prevent.  That's why it's important to get your Rh status checked in your first trimester. It doesn't cause any warning signs. A blood test is the only way to know if you are Rh-sensitive or are at risk for it.  How can you prevent Rh disease?  If you are Rh-negative, your doctor gives you an Rh immune globulin shot (such as RhoGAM). It helps prevent your body from making the antibodies that attack your baby's red blood cells.  Timing is important. You need the shot at certain times during your pregnancy. And you need one anytime there is a chance that your baby's blood might mix with yours. That can happen with certain prenatal tests or when you have pregnancy bleeding, such as:  Right after any pregnancy loss, amniocentesis, or CVS testing.  After turning of a breech baby.  Before and maybe after childbirth. Your doctor gives you a shot around week 28. If your  is Rh-positive, you will have another shot.  Follow-up care is a key part of your treatment and safety. Be sure to make and go to all appointments, and call your doctor if you are having problems. It's also a good idea to know your test results and keep a list of the medicines you take.  Where can you learn more?  Go to https://www.Jive Software.net/patiented  Enter W177 in the search box to learn more about \"Learning About Preventing Rh Disease.\"  Current as of: July 10, 2023               Content Version: 14.0    0224-4428 Healthwise, Incorporated.   Care instructions adapted under license by [facility] healthcare " professional. If you have questions about a medical condition or this instruction, always ask your healthcare professional. Healthwise, [facility] disclaims any warranty or liability for your use of this information.      Learning About Rh Immunoglobulin Shots  Introduction     An Rh immunoglobulin shot is given to pregnant women who have Rh-negative blood.  You may have Rh-negative blood, and your baby may have Rh-positive blood. If the two types of blood mix, your body will make antibodies. This is called Rh sensitization. Most of the time, this is not a problem the first time you're pregnant. But it could cause problems in future pregnancies.  This shot keeps your body from making the antibodies. You get the shot around 28 weeks of pregnancy. After the birth, your baby's blood is tested. If the blood is Rh positive, you will get another shot. You may also get the shot if you have vaginal bleeding while you are pregnant or if you have a miscarriage. These shots protect future pregnancies.  Women with Rh negative blood will need this shot each time they get pregnant.  Example  Rh immunoglobulin (HypRho-D, MICRhoGAM, and RhoGAM)  Possible side effects  Rare side effects may include:  Some mild pain where you got the shot.  A slight fever.  An allergic reaction.  You may have other side effects not listed here. Check the information that comes with your medicine.  What to know about taking this medicine  You may need more than one shot. You may need the shot again:  After amniocentesis, fetal blood sampling, or chorionic villus sampling tests.  If you have bleeding in your second or third trimester.  After turning of a breech baby.  After an injury to the belly while you are pregnant.  After a miscarriage or an .  Before or right after treatment for an ectopic or a partial molar pregnancy.  Tell your doctor if you have any allergies or have had a bad response to medicines in the past.  If you get this shot  "within 3 months of getting a live-virus vaccine, the vaccine may not work. Your doctor will tell you if you need more vaccine.  Check with your doctor or pharmacist before you use any other medicines. This includes over-the-counter medicines. Make sure your doctor knows all of the medicines, vitamins, herbs, and supplements you take. Taking some medicines at the same time can cause problems.  Where can you learn more?  Go to https://www.Pixel [email or web address]/patiented  Enter V615 in the search box to learn more about \"Learning About Rh Immunoglobulin Shots.\"  Current as of: July 10, 2023               Content Version: 14.0    5003-5692 [employer].   Care instructions adapted under license by your healthcare professional. If you have questions about a medical condition or this instruction, always ask your healthcare professional. [employer] disclaims any warranty or liability for your use of this information.      Rubella (Anguillan Measles): Care Instructions  Overview  Rubella, also called Anguillan measles or 3-day measles, is a disease caused by a virus. It spreads by coughs, sneezes, and close contact. Rubella usually is mild and does not cause long-term problems. But if you are pregnant and get it, you can give the disease to your unborn baby. This can cause serious birth defects.  While you have rubella, you may get a rash and a mild fever, and the lymph glands in your neck may swell. Older children often have a fever, eye pain, a sore throat, and body aches. You can relieve most symptoms with care at home. Avoid being around others, especially pregnant people, until your rash has been gone for at least 4 days. People who have not had this disease before or have not had the vaccine have the greatest chance of getting the virus.  Follow-up care is a key part of your treatment and safety. Be sure to make and go to all appointments, and call your doctor if you are having problems. It's also a good " "idea to know your test results and keep a list of the medicines you take.  How can you care for yourself at home?  Drink plenty of fluids. If you have kidney, heart, or liver disease and have to limit fluids, talk with your doctor before you increase the amount of fluids you drink.  Get plenty of rest to help your body heal.  Take an over-the-counter pain medicine, such as acetaminophen (Tylenol), ibuprofen (Advil, Motrin), or naproxen (Aleve), to reduce fever and discomfort. Read and follow all instructions on the label. Do not give aspirin to anyone younger than 20. It has been linked to Reye syndrome, a serious illness.  Do not take two or more pain medicines at the same time unless the doctor told you to. Many pain medicines have acetaminophen, which is Tylenol. Too much acetaminophen (Tylenol) can be harmful.  Try not to scratch the rash. Put cold, wet cloths on the rash to reduce itching.  Do not smoke. Smoking can make your symptoms worse. If you need help quitting, talk to your doctor about stop-smoking programs and medicines. These can increase your chances of quitting for good.  Avoid contact with people who have never had rubella and who have not been immunized.  When should you call for help?   Call your doctor now or seek immediate medical care if:    You have a fever with a stiff neck or a severe headache.     You are sensitive to light or feel very sleepy or confused.   Watch closely for changes in your health, and be sure to contact your doctor if:    You do not get better as expected.   Where can you learn more?  Go to https://www.[facility].net/patiented  Enter B812 in the search box to learn more about \"Rubella (Faroese Measles): Care Instructions.\"  Current as of: June 12, 2023               Content Version: 14.0    2303-4212 [employer].   Care instructions adapted under license by your healthcare professional. If you have questions about a medical condition or this instruction, " always ask your healthcare professional. Healthwise, [facility] disclaims any warranty or liability for your use of this information.      Gonorrhea and Chlamydia: About These Tests  What is it?  These tests use a sample of urine or other body fluid to look for the bacteria that cause these sexually transmitted infections (STIs). The fluid sample can come from the cervix, vagina, rectum, throat, or eyes.  Why is this test done?  These tests may be done to:  Find out if symptoms are caused by gonorrhea or chlamydia.  Check people who are at high risk of being infected with gonorrhea or chlamydia.  Retest people several months after they have been treated for gonorrhea or chlamydia.  Check for infection in your  if you had a gonorrhea or chlamydia infection at the time of delivery.  How can you prepare for the test?  If you are going to have a urine test, do not urinate for at least 1 hour before the test.  If you think you may have chlamydia or gonorrhea, don't have sexual intercourse until you get your test results. And you may want to have tests for other STIs, such as HIV.  How is the test done?  For a direct sample, a swab is used to collect body fluid from the cervix, vagina, rectum, throat, or eyes. Your doctor may collect the sample. Or you may be given instructions on how to collect your own sample.  For a urine sample, you will collect the urine that comes out when you first start to urinate. Don't wipe the genital area clean before you urinate.  How long does the test take?  The test will take a few minutes.  What happens after the test?  You will be able to go home right away.  You can go back to your usual activities right away.  If you do have an infection, don't have sexual intercourse for 7 days after you start treatment. And your sex partner(s) should also be treated.  Follow-up care is a key part of your treatment and safety. Be sure to make and go to all appointments, and call your doctor  "if you are having problems. It's also a good idea to keep a list of the medicines you take. Ask your doctor when you can expect to have your test results.  Where can you learn more?  Go to https://www.[employer].net/patiented  Enter K976 in the search box to learn more about \"Gonorrhea and Chlamydia: About These Tests.\"  Current as of: November 27, 2023               Content Version: 14.0    0337-9277 [employer].   Care instructions adapted under license by your healthcare professional. If you have questions about a medical condition or this instruction, always ask your healthcare professional. [employer] disclaims any warranty or liability for your use of this information.      Trichomoniasis: About This Test  What is it?     This test uses a sample of urine or other body fluid to look for the tiny parasite that causes trichomoniasis (also called trich). The fluid sample can come from the vagina, cervix, or urethra. Your doctor may choose to use one or more of many available tests.  Why is it done?  A trich test may be done to:  Find out if symptoms are caused by trich.  Check people who are at high risk for being infected with trich.  Check after treatment to make sure that the infection is gone.  How do you prepare for the test?  If you are going to have a urine test, do not urinate for at least 1 hour before the test.  How is the test done?  For a direct sample, a swab is used to collect body fluid from the cervix, vagina, or urethra. Your doctor may collect the sample. Or you may be given instructions on how to collect your own sample.  For a urine sample, you will collect the urine that comes out when you first start to urinate. Don't wipe the area clean before you urinate.  How long does the test take?  It will take a few minutes to collect a sample.  What happens after the test?  You can go home right away.  You can go back to your usual activities right away.  You may get the " test results the same day or several days later. It depends on the test used.  If you do have an infection, don't have sexual intercourse for 7 days after you start treatment. Your sex partner or partners should also be treated.  Follow-up care is a key part of your treatment and safety. Be sure to make and go to all appointments, and call your doctor if you are having problems. Ask your doctor when you can expect to have your test results.  Current as of: November 27, 2023               Content Version: 14.0    8124-8777 [employer].   Care instructions adapted under license by your healthcare professional. If you have questions about a medical condition or this instruction, always ask your healthcare professional. [employer] disclaims any warranty or liability for your use of this information.      HIV Testing: Care Instructions  Overview  You can get tested for the human immunodeficiency virus (HIV). Most doctors use a blood test to check for HIV antibodies and antigens in your blood. It may also check for the genetic material (RNA) of HIV. Some tests use saliva to check for HIV antibodies. But these aren't as accurate. For example, they may give a false result if you've just been infected.  What do the results mean?    Normal (negative)    No HIV antibodies, antigens, or RNA were found.  You may need more testing. It can make sure your test results are correct.    Uncertain (indeterminate)    Test results didn't clearly show if you have an HIV infection.  HIV antibodies or antigens may not have formed yet.  Some other type of antibody or antigen may have affected the results.  You will need another test to be sure.    Abnormal (positive)    HIV antibodies, antigens, or RNA were found.  If you haven't had an RNA test yet, one will be done. If it's positive, you have HIV.  If your test result is positive, your doctor will talk to you. You will discuss starting treatment.  Follow-up care  "is a key part of your treatment and safety. Be sure to make and go to all appointments, and call your doctor if you are having problems. It's also a good idea to know your test results and keep a list of the medicines you take.  Where can you learn more?  Go to https://www.[employer].net/patiented  Enter T792 in the search box to learn more about \"HIV Testing: Care Instructions.\"  Current as of: June 12, 2023               Content Version: 14.0    2182-4770 [employer].   Care instructions adapted under license by your healthcare professional. If you have questions about a medical condition or this instruction, always ask your healthcare professional. "[employer] disclaims any warranty or liability for your use of this information.      Hepatitis C Virus Tests: About These Tests  What are they?     Hepatitis C virus tests are blood tests that check for substances in the blood that show whether you have hepatitis C now or had it in the past. The tests can also tell you what type of hepatitis C you have and how severe the disease is. This can help your doctor with treatment.  If the tests show that you have long-term hepatitis C, you need to take steps to prevent spreading the disease.  Why are these tests done?  You may need these tests if:  You have symptoms of hepatitis.  You may have been exposed to the virus. You are more likely to have been exposed to the virus if you inject drugs or are exposed to body fluids (such as if you are a health care worker).  You've had other tests that show you have liver problems.  You are 18 to 79 years old.  You have an HIV infection.  The tests also are done to help your doctor decide about your treatment and see how well it works.  How do you prepare for the test?  In general, there's nothing you have to do before this test, unless your doctor tells you to.  How is the test done?  A health professional uses a needle to take a blood sample, usually from " "the arm.  What happens after these tests?  You will probably be able to go home right away.  You can go back to your usual activities right away.  Follow-up care is a key part of your treatment and safety. Be sure to make and go to all appointments, and call your doctor if you are having problems. It's also a good idea to keep a list of the medicines you take. Ask your doctor when you can expect to have your test results.  Where can you learn more?  Go to https://www.[employer].net/patiented  Enter W551 in the search box to learn more about \"Hepatitis C Virus Tests: About These Tests.\"  Current as of: June 12, 2023               Content Version: 14.0    7162-9962 [employer].   Care instructions adapted under license by your healthcare professional. If you have questions about a medical condition or this instruction, always ask your healthcare professional. [employer] disclaims any warranty or liability for your use of this information.      Learning About Fetal Ultrasound Results  What is a fetal ultrasound?     Fetal ultrasound is a test that lets your doctor see an image of your baby. Your doctor learns information about your baby from this picture. You may find out, for example, if you are having a boy or a girl. But the main reason you have this test is to get information about your baby's health.  (You may hear your baby called a fetus. This is a common medical term for a baby that's growing in the mother's uterus.)  What kind of information can you learn from this test?  The findings of an ultrasound fall into two categories, normal and abnormal.  Normal  The fetus is the right size for its age.  The placenta is the expected size and does not cover the cervix.  There is enough amniotic fluid in the uterus.  No birth defects can be seen.  Abnormal  The fetus is small or large for its age.  The placenta covers the cervix.  There is too much or too little amniotic fluid in the " uterus.  The fetus may have a birth defect.  What does an abnormal result mean?  Abnormal seems to imply that something is wrong with your baby. But what it means is that the test has shown something the doctor wants to take a closer look at.  And that's what happens next. Your doctor will talk to you about what further test or tests you may need.  What do the results mean?  Some of the things your doctor may see on an abnormal ultrasound include:  Echogenic bowel.  The bowel looks very bright on the screen. This could mean that there's blood in the bowel. Or it could mean that something is blocking the small bowel.  Increased nuchal translucency.  The ultrasound measures the thickness at the back of the baby's neck. An increase in thickness is sometimes an early sign of Down syndrome.  Increased or decreased amniotic fluid.  The doctor will look for a reason for the level of amniotic fluid and will watch the pregnancy closely as it progresses.  Large ventricles.  Ventricles in the brain look larger than they should. Your doctor may take a closer look at the brain.  Renal pyelectasis/hydronephrosis.  The ultrasound measures the fluid around the kidney. If there is more fluid than expected, there is a chance of urinary tract or kidney problems.  Short long bones.  The ultrasound measures certain arm and leg bones. A long bone (humerus or femur) that is shorter than average could be a sign of Down syndrome.  Subchorionic hemorrhage.  An ultrasound can show bleeding under one of the membranes that surrounds the fetus. Some women don't have symptoms of bleeding. The ultrasound can find this problem when women are not bleeding from their vagina. Women who have this condition have a slightly higher chance of miscarriage.  What do you do now?  Take a deep breath, and let it out. Keep in mind that an abnormal finding on an ultrasound, after it's coupled with more information, may:  Turn out to be nothing.  Turn out to be  "something mild that won't affect the baby.  Turn out to be something more serious. But if this happens, early diagnosis helps you and your doctor plan treatment options sooner rather than later.  Your medical team is there for you. So are your family and friends. Ask questions, and get the help and support you need.  Follow-up care is a key part of your treatment and safety. Be sure to make and go to all appointments, and call your doctor if you are having problems. It's also a good idea to know your test results and keep a list of the medicines you take.  Where can you learn more?  Go to https://www.[employer].net/patiented  Enter K451 in the search box to learn more about \"Learning About Fetal Ultrasound Results.\"  Current as of: July 10, 2023               Content Version: 14.0    9584-9030 [employer].   Care instructions adapted under license by your healthcare professional. If you have questions about a medical condition or this instruction, always ask your healthcare professional. [employer] disclaims any warranty or liability for your use of this information.      Learning About Prenatal Visits  Overview     Regular prenatal visits are very important during any pregnancy. These quick office visits may seem simple and routine. But they can help you have a safe and healthy pregnancy. Your doctor is watching for problems that can only be found through regular checkups. The visits also give you and your doctor time to build a good relationship.  After your first visit, you will most likely start on a schedule of monthly visits. In your third trimester, the visits will get more frequent. Based on your health, your age, and if you've had a normal, full-term pregnancy before, your doctor may want to see you more or less often.  At different times in your pregnancy, you will have exams and tests. Some are routine. Others are done only when there is a chance of a problem. Everything healthy " you do for your body helps you have a healthy pregnancy. Rest when you need it. Eat well, drink plenty of water, and exercise regularly.  What happens during a prenatal visit?  You will have blood pressure checks, along with urine tests. You also may have blood tests. If you need to go to the bathroom while waiting for the doctor, tell the nurse. You will be given a sample cup so your urine can be tested.  You will be weighed and have your belly measured.  Your doctor may listen to the fetal heartbeat with a special device.  At about 24 weeks, and possibly earlier in your pregnancy, your doctor will check your blood sugar (glucose tolerance test) for diabetes that can occur during pregnancy. This is gestational diabetes, which can be harmful.  You will have tests to check for infections that could harm your . These include group B streptococcus and hepatitis B.  Your doctor may do ultrasounds to check for problems. This also checks the position of the fetus. An ultrasound uses sound waves to produce a picture of the fetus.  You may get your vaccines updated.  Your doctor may ask you questions to check for signs of anxiety or depression. Tell your doctor if you feel sad, anxious, or hopeless for more than a few days.  You may have other tests at any time during your pregnancy.  Use your visits to discuss with your doctor any concerns you have.  How can you care for yourself at home?  Get plenty of rest.  Try to exercise every day, if your doctor says it is okay. If you have not exercised in the past, start out slowly. For example, you can take short walks each day.  Choose healthy foods, such as fruits, vegetables, whole grains, lean proteins, low-fat dairy, and healthy fats.  Drink plenty of fluids. Cut down on drinks with caffeine, such as coffee, tea, and cola. If you have kidney, heart, or liver disease and have to limit fluids, talk with your doctor before you increase the amount of fluids you drink.  Try  "to avoid chemical fumes, paint fumes, and poisons.  If you smoke, vape, or use alcohol, marijuana, or other drugs, quit or cut back as much as you can. Talk to your doctor if you need help quitting.  Review all of your medicines, including over-the-counter medicines and supplements, with your doctor. Some of your routine medicines may need to be changed. Do not stop or start taking any medicines without talking to your doctor first.  Follow-up care is a key part of your treatment and safety. Be sure to make and go to all appointments, and call your doctor if you are having problems. It's also a good idea to know your test results and keep a list of the medicines you take.  Where can you learn more?  Go to https://www.[employer].net/patiented  Enter J502 in the search box to learn more about \"Learning About Prenatal Visits.\"  Current as of: July 10, 2023               Content Version: 14.0    9978-1221 [email or web address].   Care instructions adapted under license by your healthcare professional. If you have questions about a medical condition or this instruction, always ask your healthcare professional. [email or web address] disclaims any warranty or liability for your use of this information.      Intimate Partner Violence: Care Instructions  Overview     If you want to save this information but don't think it is safe to take it home, see if a trusted friend can keep it for you. Plan ahead. Know who you can call for help, and memorize the phone number.   Be careful online too. Your online activity may be seen by others. Do not use your personal computer or device to read about this topic. Use a safe computer, such as one at work, a friend's home, or a library.    Intimate partner violence--a type of domestic abuse--is different from an argument now and then. It is a pattern of abuse that one person may use to control another person's behavior. It may start with threats and name-calling. Then, it may lead to " more serious acts, like pushing and slapping. The abuse also may occur in other areas. For example, the abuser may withhold money or spend a partner's money without their knowledge.  Abuse can cause serious harm. You are more likely to have a long-term health problem from the injuries and stress of living in a violent relationship. People who are sexually abused by their partners have more sexually transmitted infections and unplanned pregnancies. Anyone who is abused also faces emotional pain. Anyone can be abused in relationships. In some relationships, both people use abusive behavior.  If you are pregnant, abuse can cause problems such as poor weight gain, infections, and bleeding. Abuse during this time may increase your baby's risk of low birth weight, premature birth, and death.  Follow-up care is a key part of your treatment and safety. Be sure to make and go to all appointments, and call your doctor if you are having problems. It's also a good idea to know your test results and keep a list of the medicines you take.  How can you care for yourself at home?  If you do not have a safe place to stay, discuss this with your doctor before you leave.  Have a plan for where to go, how to leave your home, and where to stay in case of an emergency. Do not tell your partner about your plan. Contact:  The National Domestic Violence Hotline toll-free at 1-322.709.3411. They can help you find resources in your area.  Your local police department, hospital, or clinic for information about shelters and safe homes near you.  Talk to a trusted friend or neighbor, a counselor, or a lauren leader. Do not feel that you have to hide what happened.  Teach your children how to call for help in an emergency.  Be alert to warning signs, such as threats, heavy alcohol use, or drug use. This can help you avoid danger.  If you can, make sure that there are no guns or other weapons in your home.  When should you call for help?   Call 707  "anytime you think you may need emergency care. For example, call if:    You or someone else has just been abused.     You think you or someone else is in danger of being abused.   Watch closely for changes in your health, and be sure to contact your doctor if you have any problems.  Where can you learn more?  Go to https://www.[employer].net/patiented  Enter G282 in the search box to learn more about \"Intimate Partner Violence: Care Instructions.\"  Current as of: June 24, 2023               Content Version: 14.0    2162-4331 [employer].   Care instructions adapted under license by your healthcare professional. If you have questions about a medical condition or this instruction, always ask your healthcare professional. [employer] disclaims any warranty or liability for your use of this information.      Intimate Partner Violence Safety Instructions: Care Instructions  Overview     If you want to save this information but don't think it is safe to take it home, see if a trusted friend can keep it for you. Plan ahead. Know who you can call for help, and memorize the phone number.   Be careful online too. Your online activity may be seen by others. Do not use your personal computer or device to read about this topic. Use a safe computer, such as one at work, a friend's home, or a library.    When you are abused by a spouse or partner, you can take actions to protect yourself and your children.  You can increase your safety whether you decide to stay with your spouse or partner or you decide to leave. You may want to make a safety plan and pack a bag ahead of time. This will help you leave quickly when you decide to. Remember, you cannot change your partner's actions, but you can find help for you and your children. No one deserves to be abused.  Follow-up care is a key part of your treatment and safety. Be sure to make and go to all appointments, and call your doctor if you are having " problems. It's also a good idea to know your test results and keep a list of the medicines you take.  How can you care for yourself at home?  Make a plan for your safety   If you decide to stay with your abusive spouse or partner, you can do the following to increase your safety:  Decide what works best to keep you safe in an emergency.  Know who you can call to help you in an emergency.  Decide if you will call the police if you get hurt again. If you can, agree on a signal with your children or neighbor to call the police for you if you need help. You can flash lights or hang something out of a window.  Choose a safe place to go for a short time if you need to leave home. Memorize the address and phone number.  Learn escape routes out of your home in case you need to leave in a hurry. Teach your children different ways to get out of your home quickly if they need to.  If you can, hide or lock up things that can be used as weapons (guns, knives, hammers).  Learn the number of a domestic violence shelter. Talk to the people there about how they can help.  Find out about other community resources that can help you.  Take pictures of bruises or other injuries if you can. You can also take pictures of things your abuser has broken.  Teach your children that violence is never okay. Tell them that they do not deserve to be hurt.  Pack a bag   Prepare a bag with things you will need if you leave suddenly. Leave it with a friend, a relative, or someone else you trust. You could include the following items in the bag:  A set of keys to your home and car.  Emergency phone numbers and addresses.  Money such as cash or checks. You can also ask a friend, a relative, or someone else you trust to hold money for you.  Copies of legal documents such as house and car titles or rent receipts, birth certificates, Social Security card, voter registration, marriage and 's licenses, and your children's health records.  Personal  "items you would need for a few days, such as clothes, a toothbrush, toothpaste, and any medicines you or your children need.  A favorite toy or book for your child or children.  Diapers and bottles, if you have very young children.  Pictures that show signs of abuse and violence. You may also add pictures of your abuser.  If you leave   If you decide to leave, you can take the following steps:  Go to the emergency room at a hospital if you have been hurt.  Think about asking the police to be with you as you leave. They can protect you as you leave your home.  If you decide to leave secretly, remember that activities can be tracked. Your abuser may still have access to your cell phone, email, and credit cards. It may be possible for these to be traced. Always be aware of your surroundings.  If this is an emergency, do not worry about gathering up anything. Just leave--your safety is most important.  If your abuser moves out, change the locks on the doors. If you have a security system, change the access code.  When should you call for help?   Call 911 anytime you think you may need emergency care. For example, call if:    You or someone else has just been abused.     You think you or someone else is in danger of being abused.   Watch closely for changes in your health, and be sure to contact your doctor if you have any problems.  Where can you learn more?  Go to https://www.[employer].net/patiented  Enter A752 in the search box to learn more about \"Intimate Partner Violence Safety Instructions: Care Instructions.\"  Current as of: June 24, 2023               Content Version: 14.0    3103-9879 [employer].   Care instructions adapted under license by your healthcare professional. If you have questions about a medical condition or this instruction, always ask your healthcare professional. [employer] disclaims any warranty or liability for your use of this information.      Learning About " Intimate Partner Violence  What is intimate partner violence?  Intimate partner violence is a type of domestic abuse. It's threatening, emotionally harmful, or violent behavior in a personal relationship. It can happen between past or current partners or spouses. In some relationships both people abuse each other. One partner may be more abusive. Or the abuse may be equal.  Abuse can affect people of any ethnic group, race, or Jehovah's witness. It can affect teens, adults, or the elderly. And it can happen to people of any sexual orientation, gender, or social status.  Abusers use fear, bullying, and threats to control their partners. They may control what their partners do. They may control where their partners go or who they see. They may act jealous, controlling, or possessive. These early signs of abuse may happen soon after the start of the relationship. Sometimes it can be hard to notice abuse at first. But after the relationship becomes more serious, the abuse may get worse.  If you are being abused in your relationship, it's important to get help. The abuse is not your fault. You don't have to face it alone.  Be careful  It may not be safe to take home domestic abuse information like this handout. Some people ask a trusted friend to keep it for them. It's also important to plan ahead and to memorize the phone number of places you can go for help. If you are concerned about your safety, do not use your computer, smartphone, or tablet to read about domestic abuse.   What are the types of intimate partner violence?  Abuse can happen in different ways. Each type can happen on its own or in combination with others.  Emotional abuse  Emotional abuse is a pattern of threats, insults, or controlling behavior. It includes verbal abuse. It goes beyond healthy disagreements in a relationship. It's a sign of an unhealthy relationship.  Do you feel threatened, intimidated, or controlled?  Does your partner:  Threaten your  children, other family members, or pets?  Use jokes meant to embarrass or shame you?  Call you names?  Tell you that you are a bad parent?  Threaten to take away your children?  Threaten to have you or your family members deported?  Control your access to money or other basic needs?  Control what you do, who you see or talk to, or where you go?  Another form of emotional abuse is denying that it is happening. Or the abuser may act like the abuse is no big deal or is your fault.  Sexual abuse  With sexual abuse, abusers may try to convince or force you to have sex. They may force you into sex acts you're not comfortable with. Or they may sexually assault you. Sexual abuse can happen even if you are in a committed relationship.  Physical abuse  Physical abuse means that a partner hits, kicks, or does something else to physically hurt you. Physical abuse that starts with a slap might lead to kicking, shoving, and choking over time. The abuser may also threaten to hurt or kill you.  Stalking  Stalking means that an abuser gives you attention that you do not want and that causes you fear. Examples of stalking include:  Following you.  Showing up at places where the abuser isn't invited, such as at your work or school.  Constantly calling or texting you.  What problems can  to?  Intimate partner violence can be very dangerous. It can cause serious, repeated injury. It can even lead to death.  All forms of abuse can cause long-term health problems from the stress of a violent relationship. Verbal abuse can lead to sexual and physical abuse.  Abuse causes:  Emotional pain.  Depression.  Anxiety.  Post-traumatic stress.  Sexual abuse can lead to sexually transmitted infections (such as HIV/AIDS) and unplanned pregnancy.  Pregnancy can be a very dangerous time for people in abusive relationships. Abuse can cause or increase the risk of problems during pregnancy. These include low weight gain, anemia, infections, and  "bleeding. Abuse may also increase your baby's risk of low birth weight, premature birth, and death.  It can be hard for some victims of abuse to ask for help or to leave their relationship. You may feel scared, stuck, or not sure what steps to take. But it's important not to ignore abuse. Talking to someone you trust could be the first step to ending the abuse and taking care of your own health and happiness again. There are resources available that can help keep you safe.  Where can you get help?  Talk to a trusted friend. Find a local advocacy group, or talk to your doctor about the abuse.  Contact the National Domestic Violence Hotline at 7-609-865-SIJN (1-198.718.6629) for more safety tips. They can guide you to groups in your area that can help. Or go to the National Coalition Against Domestic Violence website at www.thehotlDoctorBase.org to learn more.  Domestic violence groups or a counselor in your area can help you make a safety plan for yourself and your children.  When to call for help  Call 911 anytime you think you may need emergency care. For example, call if:  You think that you or someone you know is in danger of being abused.  You have been hurt and can't have someone safely take you to emergency care.  You have just been abused.  A family member has just been abused.  Where can you learn more?  Go to https://www.[employer].net/patiented  Enter S665 in the search box to learn more about \"Learning About Intimate Partner Violence.\"  Current as of: June 24, 2023               Content Version: 14.0    6355-7276 [employer]   Care instructions adapted under license by your healthcare professional. If you have questions about a medical condition or this instruction, always ask your healthcare professional. [employer] disclaims any warranty or liability for your use of this information.      Vaginal Bleeding During Pregnancy: Care Instructions  Overview     It's common to have some " vaginal spotting when you are pregnant. In some cases, the bleeding isn't serious. And there aren't any more problems with the pregnancy.  But sometimes bleeding is a sign of a more serious problem. This is more common if the bleeding is heavy or painful. Examples of more serious problems include miscarriage, an ectopic pregnancy, and a problem with the placenta.  You may have to see your doctor again to be sure everything is okay. You may also need more tests to find the cause of the bleeding.  Home treatment may be all you need. But it depends on what is causing the bleeding. Be sure to tell your doctor if you have any new symptoms or if your symptoms get worse.  The doctor has checked you carefully, but problems can develop later. If you notice any problems or new symptoms, get medical treatment right away.  Follow-up care is a key part of your treatment and safety. Be sure to make and go to all appointments, and call your doctor if you are having problems. It's also a good idea to know your test results and keep a list of the medicines you take.  How can you care for yourself at home?  If your doctor prescribed medicines, take them exactly as directed. Call your doctor if you think you are having a problem with your medicine.  Do not have vaginal sex until your doctor says it's okay.  Do not put anything in your vagina until your doctor says it's okay.  Ask your doctor about other activities you can or can't do.  Get a lot of rest. Being pregnant can make you tired.  Do not use nonsteroidal anti-inflammatory drugs (NSAIDs), such as ibuprofen (Advil, Motrin), naproxen (Aleve), or aspirin, unless your doctor says it is okay.  When should you call for help?   Call 911 anytime you think you may need emergency care. For example, call if:    You passed out (lost consciousness).     You have severe vaginal bleeding. This means you are soaking through a pad each hour for 2 or more hours.     You have sudden, severe pain  "in your belly or pelvis.   Call your doctor now or seek immediate medical care if:    You have new or worse vaginal bleeding.     You are dizzy or lightheaded, or you feel like you may faint.     You have pain in your belly, pelvis, or lower back.     You think that you are in labor.     You have a sudden release of fluid from your vagina.     You've been having regular contractions for an hour. This means that you've had at least 8 contractions within 1 hour or at least 4 contractions within 20 minutes, even after you change your position and drink fluids.     You notice that your baby has stopped moving or is moving much less than normal.   Watch closely for changes in your health, and be sure to contact your doctor if you have any problems.  Where can you learn more?  Go to https://www.[employer].net/patiented  Enter N829 in the search box to learn more about \"Vaginal Bleeding During Pregnancy: Care Instructions.\"  Current as of: July 10, 2023               Content Version: 14.0    4525-7150 [employer].   Care instructions adapted under license by your healthcare professional. If you have questions about a medical condition or this instruction, always ask your healthcare professional. [employer] disclaims any warranty or liability for your use of this information.      Weeks 10 to 14 of Your Pregnancy: Care Instructions  It's now possible to hear the fetus's heartbeat with a special ultrasound device. And the fetus's organs are developing.    Decide about tests to check for birth defects. Think about your age, your chance of passing on a family disease, your need to know about any problems, and what you might do after you have the test results.    It's okay to exercise. Try activities such as walking or swimming. Check with your doctor before starting a new program.    You may feel more tired than usual.  Taking naps during the day may help.     You may feel emotional.  It might help " "to talk to someone.     You may have headaches.  Try lying down and putting a cool cloth over your forehead.     You can use acetaminophen (Tylenol) for pain relief.  Don't take any anti-inflammatory medicines (such as Advil, Motrin, Aleve), unless your doctor says it's okay.     You may feel a fullness or aching in your lower belly.  This can feel like the kind of cramps you might get before a period. A back rub may help.     You may need to urinate more.  Your growing uterus and changing hormones can affect your bladder.     You may feel sick to your stomach (morning sickness).  Try avoiding food and smells that make you feel sick.     Your breasts may feel different.  They may feel tender or get bigger. Your nipples may get darker. Try a bra that gives you good support.     Avoid alcohol, tobacco, and drugs (including marijuana).  If you need help quitting, talk to your doctor.     Take a daily prenatal vitamin.  Choose one with folic acid.   Follow-up care is a key part of your treatment and safety. Be sure to make and go to all appointments, and call your doctor if you are having problems. It's also a good idea to know your test results and keep a list of the medicines you take.  Where can you learn more?  Go to https://www.[employer].net/patiented  Enter E090 in the search box to learn more about \"Weeks 10 to 14 of Your Pregnancy: Care Instructions.\"  Current as of: July 10, 2023               Content Version: 14.0    8222-2449 [employer].   Care instructions adapted under license by your healthcare professional. If you have questions about a medical condition or this instruction, always ask your healthcare professional. [employer] disclaims any warranty or liability for your use of this information.        Nutrition During Pregnancy: Care Instructions  Overview     Healthy eating when you are pregnant is important for you and your baby. It can help you feel well and have a " successful pregnancy and delivery. During pregnancy your nutrition needs increase. Even if you have excellent eating habits, your doctor may recommend a multivitamin to make sure you get enough iron and folic acid.  You may wonder how much weight you should gain. In general, if you were at a healthy weight before you became pregnant, then you should gain between 25 and 35 pounds. If you were overweight before pregnancy, then you'll likely be advised to gain 15 to 25 pounds. If you were underweight before pregnancy, then you'll probably be advised to gain 28 to 40 pounds. Your doctor will work with you to set a weight goal that is right for you. Gaining a healthy amount of weight helps you have a healthy baby.  Follow-up care is a key part of your treatment and safety. Be sure to make and go to all appointments, and call your doctor if you are having problems. It's also a good idea to know your test results and keep a list of the medicines you take.  How can you care for yourself at home?  Eat plenty of fruits and vegetables. Include a variety of orange, yellow, and leafy dark-green vegetables every day.  Choose whole-grain bread, cereal, and pasta. Good choices include whole wheat bread, whole wheat pasta, brown rice, and oatmeal.  Get 4 or more servings of milk and milk products each day. Good choices include nonfat or low-fat milk, yogurt, and cheese. If you cannot eat milk products, you can get calcium from calcium-fortified products such as orange juice, soy milk, and tofu. Other non-milk sources of calcium include leafy green vegetables, such as broccoli, kale, mustard greens, turnip greens, bok mansoor, and brussels sprouts.  If you eat meat, pick lower-fat types. Good choices include lean cuts of meat and chicken or turkey without the skin.  Avoid fish that are high in mercury. These include shark, swordfish, stephenie mackerel, marlin, orange roughy, and bigeye tuna, as well as tilefish from the [place]  [place].  It's okay to eat up to 8 to 12 ounces a week of fish that are low in mercury or up to 4 ounces a week of fish that have medium levels of mercury. Some fish that are low in mercury are salmon, shrimp, canned light tuna, cod, and tilapia. Some fish that have medium levels of mercury are halibut and white albacore tuna.  For more advice about eating fish, you can visit the U.S. Food and Drug Administration (FDA) or U.S. Environmental Protection Agency (EPA) website.  Heat lunch meats (such as turkey, ham, or bologna) to 165 F before you eat them. This reduces your risk of getting sick from a kind of bacteria that can be found in lunch meats.  Do not eat unpasteurized soft cheeses, such as brie, feta, fresh mozzarella, and blue cheese. They have a bacteria that could harm your baby.  Limit caffeine to about 200 to 300 mg per day. On average, a cup of brewed coffee has around 80 to 100 mg of caffeine.  Do not drink any alcohol. No amount of alcohol has been found to be safe during pregnancy.  Do not diet or try to lose weight. For example, do not follow a low-carbohydrate diet. If you are overweight at the start of your pregnancy, your doctor will work with you to manage your weight gain.  Tell your doctor about all vitamins and supplements you take.  When should you call for help?  Watch closely for changes in your health, and be sure to contact your doctor if you have any problems.  Where can you learn more?  Go to https://www.[employer].net/patiented  Enter Y785 in the search box to learn more about \"Nutrition During Pregnancy: Care Instructions.\"  Current as of: September 20, 2023               Content Version: 14.0    6510-3815 [employer].   Care instructions adapted under license by your healthcare professional. If you have questions about a medical condition or this instruction, always ask your healthcare professional. [employer] disclaims any warranty or liability for your use " of this information.      Exercise During Pregnancy: Care Instructions  Overview     Exercise is good for you during a healthy pregnancy. It can relieve back pain, swelling, and other discomforts. It also prepares your muscles for childbirth. And exercise can improve your energy level and help you sleep better.  If your doctor advises it, get more exercise. For example, walking is a good choice. Bit by bit, increase the amount you walk every day. Try for at least 30 minutes on most days of the week. You could also try a prenatal exercise class. But if you do not already exercise, be sure to talk with your doctor before you start a new exercise program. Doctors do not recommend contact sports during pregnancy.  Follow-up care is a key part of your treatment and safety. Be sure to make and go to all appointments, and call your doctor if you are having problems. It's also a good idea to know your test results and keep a list of the medicines you take.  How can you care for yourself at home?  Talk with your doctor about the right kind of exercise for each stage of pregnancy.  Listen to your body to know if your exercise is at a safe level.  Do not become overheated while you exercise. High body temperature can be harmful. Avoid activities that can make your body too hot.  If you feel tired, take it easy. You might walk instead of run.  If you are used to strenuous exercise, ask your doctor how to know when it's time to slow down.  If you exercised before getting pregnant, you should be able to keep up your routine early in your pregnancy. Later in your pregnancy, you may want to switch to more gentle activities.  Drink plenty of fluids before, during, and after exercise.  Avoid contact sports, such as soccer and basketball. Also avoid risky activities. These include scuba diving, horseback riding, and exercising at a high altitude (above 6,000 feet). If you live in a place with a high altitude, talk to your doctor about  "how you can exercise safely.  Do not get overtired while you exercise. You should be able to talk while you work out.  After your fourth month of pregnancy, avoid exercises that require you to lie flat on your back on a hard surface. These include sit-ups and some yoga poses.  Get plenty of rest. You may be very tired while you are pregnant.  Where can you learn more?  Go to https://www.[employer].net/patiented  Enter S801 in the search box to learn more about \"Exercise During Pregnancy: Care Instructions.\"  Current as of: July 10, 2023               Content Version: 14.0    2316-2782 [employer].   Care instructions adapted under license by your healthcare professional. If you have questions about a medical condition or this instruction, always ask your healthcare professional. [employer] disclaims any warranty or liability for your use of this information.      Learning About Pregnancy and Obesity  How does your weight affect your pregnancy?     The basics of prenatal care are the same for everyone, regardless of size. You'll get what you need to have a healthy baby.  But your size can make a difference in a few things. You and your doctor will have to watch your pregnancy weight. Your weight may affect your labor and delivery.  You may have some extra doctor visits and tests. And you may have some tests earlier in your pregnancy. You'll need to pay close attention to things like blood pressure and the chance of getting gestational diabetes. (This is a type of diabetes that sometimes happens during pregnancy.) And close attention will be given to your developing baby.  Work with your doctor to get the care you need. Go to all your doctor visits, and follow your doctor's advice about what to do and what to avoid during pregnancy.  How much weight gain is healthy?  If you are very overweight (obese), experts recommend that you gain between 11 and 20 pounds. Your doctor will work with you " "to set a weight goal that's right for you. In some cases, your doctor may recommend that you not gain any weight.  How much extra food do you need to eat?  Although you may joke that you're \"eating for two\" during pregnancy, you don't need to eat twice as much food. How much you can eat depends on:  Your height.  How much you weigh when you get pregnant.  How active you are.  If you're carrying more than one fetus (multiple pregnancy).  In the first trimester, you'll probably need the same amount of calories as you did before you were pregnant. In general, in your second trimester, you need to eat about 340 extra calories a day. In your third trimester, you need to eat about 450 extra calories a day.  What can you do to have a healthy pregnancy?  The best things you can do for you and your baby are to eat healthy foods, get regular exercise, avoid alcohol and smoking, and go to your doctor visits.  Eat a variety of foods from all the food groups. Make sure to get enough calcium and folic acid.  You may want to work with a dietitian to help you plan healthy meals to get the right amount of calories for you.  If you didn't exercise much before you got pregnant, talk to your doctor about how you can slowly get more active. Your doctor may want to set up an exercise program with you.  Where can you learn more?  Go to https://www.[employer].net/patiented  Enter B644 in the search box to learn more about \"Learning About Pregnancy and Obesity.\"  Current as of: July 10, 2023               Content Version: 14.0    5968-6118 [employer].   Care instructions adapted under license by your healthcare professional. If you have questions about a medical condition or this instruction, always ask your healthcare professional. [employer] disclaims any warranty or liability for your use of this information.      You have been provided the CDC Warning Signs in Pregnancy document.    Additional copies can be " found here: www.[employer].[place]/608367.pdf

## 2024-08-05 ENCOUNTER — LAB REQUISITION (OUTPATIENT)
Dept: LAB | Facility: CLINIC | Age: 29
End: 2024-08-05
Payer: COMMERCIAL

## 2024-08-05 DIAGNOSIS — Z36.9 ENCOUNTER FOR ANTENATAL SCREENING, UNSPECIFIED: ICD-10-CM

## 2024-08-05 LAB
BASOPHILS # BLD AUTO: 0.1 10E3/UL (ref 0–0.2)
BASOPHILS NFR BLD AUTO: 1 %
EOSINOPHIL # BLD AUTO: 0.1 10E3/UL (ref 0–0.7)
EOSINOPHIL NFR BLD AUTO: 1 %
ERYTHROCYTE [DISTWIDTH] IN BLOOD BY AUTOMATED COUNT: 12.7 % (ref 10–15)
HBA1C MFR BLD: 5.1 %
HBV SURFACE AG SERPL QL IA: NONREACTIVE
HCT VFR BLD AUTO: 39.1 % (ref 35–47)
HCV AB SERPL QL IA: NONREACTIVE
HGB BLD-MCNC: 13.1 G/DL (ref 11.7–15.7)
HIV 1+2 AB+HIV1 P24 AG SERPL QL IA: NONREACTIVE
HOLD SPECIMEN: NORMAL
IMM GRANULOCYTES # BLD: 0 10E3/UL
IMM GRANULOCYTES NFR BLD: 0 %
LYMPHOCYTES # BLD AUTO: 1.7 10E3/UL (ref 0.8–5.3)
LYMPHOCYTES NFR BLD AUTO: 19 %
MCH RBC QN AUTO: 28.9 PG (ref 26.5–33)
MCHC RBC AUTO-ENTMCNC: 33.5 G/DL (ref 31.5–36.5)
MCV RBC AUTO: 86 FL (ref 78–100)
MONOCYTES # BLD AUTO: 0.5 10E3/UL (ref 0–1.3)
MONOCYTES NFR BLD AUTO: 5 %
NEUTROPHILS # BLD AUTO: 6.6 10E3/UL (ref 1.6–8.3)
NEUTROPHILS NFR BLD AUTO: 74 %
NRBC # BLD AUTO: 0 10E3/UL
NRBC BLD AUTO-RTO: 0 /100
PLATELET # BLD AUTO: 224 10E3/UL (ref 150–450)
RBC # BLD AUTO: 4.54 10E6/UL (ref 3.8–5.2)
WBC # BLD AUTO: 8.9 10E3/UL (ref 4–11)

## 2024-08-05 PROCEDURE — 86762 RUBELLA ANTIBODY: CPT | Performed by: PHYSICIAN ASSISTANT

## 2024-08-05 PROCEDURE — 87086 URINE CULTURE/COLONY COUNT: CPT | Performed by: PHYSICIAN ASSISTANT

## 2024-08-05 PROCEDURE — 86803 HEPATITIS C AB TEST: CPT | Performed by: PHYSICIAN ASSISTANT

## 2024-08-05 PROCEDURE — 87389 HIV-1 AG W/HIV-1&-2 AB AG IA: CPT | Performed by: PHYSICIAN ASSISTANT

## 2024-08-05 PROCEDURE — 86900 BLOOD TYPING SEROLOGIC ABO: CPT | Performed by: PHYSICIAN ASSISTANT

## 2024-08-05 PROCEDURE — 85025 COMPLETE CBC W/AUTO DIFF WBC: CPT | Performed by: PHYSICIAN ASSISTANT

## 2024-08-05 PROCEDURE — 87340 HEPATITIS B SURFACE AG IA: CPT | Performed by: PHYSICIAN ASSISTANT

## 2024-08-05 PROCEDURE — 83036 HEMOGLOBIN GLYCOSYLATED A1C: CPT | Performed by: PHYSICIAN ASSISTANT

## 2024-08-06 LAB
ABO/RH(D): NORMAL
ANTIBODY SCREEN: NEGATIVE
BACTERIA UR CULT: NO GROWTH
RPR SER QL: NONREACTIVE
RUBV IGG SERPL QL IA: 17.3 INDEX
RUBV IGG SERPL QL IA: POSITIVE
SPECIMEN EXPIRATION DATE: NORMAL

## 2024-08-15 ENCOUNTER — LAB (OUTPATIENT)
Dept: LAB | Facility: CLINIC | Age: 29
End: 2024-08-15
Attending: PHYSICIAN ASSISTANT
Payer: COMMERCIAL

## 2024-08-15 ENCOUNTER — TELEPHONE (OUTPATIENT)
Dept: FAMILY MEDICINE | Facility: CLINIC | Age: 29
End: 2024-08-15

## 2024-08-15 ENCOUNTER — E-VISIT (OUTPATIENT)
Dept: URGENT CARE | Facility: CLINIC | Age: 29
End: 2024-08-15
Payer: COMMERCIAL

## 2024-08-15 DIAGNOSIS — J06.9 VIRAL URI: ICD-10-CM

## 2024-08-15 DIAGNOSIS — J06.9 VIRAL URI: Primary | ICD-10-CM

## 2024-08-15 PROCEDURE — 87635 SARS-COV-2 COVID-19 AMP PRB: CPT

## 2024-08-15 PROCEDURE — 99421 OL DIG E/M SVC 5-10 MIN: CPT | Performed by: PHYSICIAN ASSISTANT

## 2024-08-15 NOTE — TELEPHONE ENCOUNTER
Patient started an e-visit with an  provider on 8/15 for sinus symptoms x 2 days.  She had a lab appointment for a swab to check for possible COVID and is now asking  for urgent care provider to send an RX.  Patient is leaving for vacation tomorrow and is 11 weeks pregnant.    Explained to patient, providers typically do not prescribe for sinus infections until 10 days of symptoms.    She may ask OB if okay to take probiotics, vitamin C, zinc, and/or sudafed for congestion. Instructed patient to drink lots of fluids and to rest.      Patient is crying and is upset.  Another option is for patient to go to urgent care; however, RN explained UC provider still may not prescribe antibiotic.     Curtis Melgar, ARUNA  Triage Nurse  [facility], [facility]

## 2024-08-15 NOTE — PATIENT INSTRUCTIONS
Dear Danielle,    After reviewing your responses, I would like you to come in for a swab to make sure we treat you correctly. This swab is to evaluate you for possible COVID, and should be scheduled for today or tomorrow. Please use the Schedule Now button in [email or web address] to schedule your swab. Otherwise, click this link to schedule a lab only appointment.    Lab appointments are not available at most locations on the weekends. If no Lab Only appointment is available, you should be seen in any of our convenient Urgent Care Centers for an in person visit, which can be found on our website here.    You will receive instructions with your results in [employer] once they are available.     If your symptoms worsen, you develop difficulty breathing, difficulty with drinking enough to stay hydrated, or fevers for more than 5 days, please contact your primary care provider for an appointment or visit an Urgent Care Center to be seen.     Symptomatic care will help you feel better while your body is fighting the virus. It is important to stay well hydrated and get lots of rest. Unless you have been told not to take these medications by your provider, I recommend taking these to help ease your symptoms. These are all safe to take in pregnancy:    - Flonase (fluticasone) nasal spray, 2 sprays in each nostril daily, to reduce swelling in your nasal passages  - Tylenol as needed for pain/fever  - A nasal rinse such as the Netti pot with bottled or distilled water and saline packets helps to flush sinuses  - Mucinex (guiafenesin) which thins mucus and may help it to loosen more quickly    You can also sit in the bathroom with the door closed and hot shower running to loosen mucus.    If your symptoms significantly worsen, you develop a severe headache, vomiting, high fever (>102F) or have not improved by day 10, please contact your primary care provider for an appointment or visit any of our convenient Walk-in Care or Urgent Care Centers  to be seen which can be found on our website?here.?     Thanks again for choosing?us?as your health care partner,?   ?  Gladis Crook PA-C?

## 2024-08-16 LAB — SARS-COV-2 RNA RESP QL NAA+PROBE: NEGATIVE

## 2024-10-21 ENCOUNTER — TRANSFERRED RECORDS (OUTPATIENT)
Dept: HEALTH INFORMATION MANAGEMENT | Facility: CLINIC | Age: 29
End: 2024-10-21
Payer: COMMERCIAL

## 2024-10-22 ENCOUNTER — PRE VISIT (OUTPATIENT)
Dept: MATERNAL FETAL MEDICINE | Facility: CLINIC | Age: 29
End: 2024-10-22
Payer: COMMERCIAL

## 2024-10-22 ENCOUNTER — TRANSCRIBE ORDERS (OUTPATIENT)
Dept: MATERNAL FETAL MEDICINE | Facility: CLINIC | Age: 29
End: 2024-10-22
Payer: COMMERCIAL

## 2024-10-22 DIAGNOSIS — O26.90 PREGNANCY RELATED CONDITION, ANTEPARTUM: Primary | ICD-10-CM

## 2024-10-23 ENCOUNTER — HOSPITAL ENCOUNTER (OUTPATIENT)
Dept: ULTRASOUND IMAGING | Facility: CLINIC | Age: 29
Discharge: HOME | End: 2024-10-23
Attending: DOULA
Payer: COMMERCIAL

## 2024-10-23 ENCOUNTER — OFFICE VISIT (OUTPATIENT)
Dept: MATERNAL FETAL MEDICINE | Facility: CLINIC | Age: 29
End: 2024-10-23
Attending: STUDENT IN AN ORGANIZED HEALTH CARE EDUCATION/TRAINING PROGRAM
Payer: COMMERCIAL

## 2024-10-23 ENCOUNTER — LAB (OUTPATIENT)
Dept: LAB | Facility: CLINIC | Age: 29
End: 2024-10-23
Attending: DOULA
Payer: COMMERCIAL

## 2024-10-23 ENCOUNTER — OFFICE VISIT (OUTPATIENT)
Dept: MATERNAL FETAL MEDICINE | Facility: CLINIC | Age: 29
End: 2024-10-23
Attending: DOULA
Payer: COMMERCIAL

## 2024-10-23 DIAGNOSIS — O28.3 ABNORMAL FETAL ULTRASOUND: Primary | ICD-10-CM

## 2024-10-23 DIAGNOSIS — O43.192 PLACENTAL CYST AFFECTING PREGNANCY IN SECOND TRIMESTER: ICD-10-CM

## 2024-10-23 DIAGNOSIS — O35.BXX0 ECHOGENIC FOCUS OF HEART OF FETUS AFFECTING ANTEPARTUM CARE OF MOTHER, SINGLE OR UNSPECIFIED FETUS: Primary | ICD-10-CM

## 2024-10-23 DIAGNOSIS — O28.3 ABNORMAL FETAL ULTRASOUND: ICD-10-CM

## 2024-10-23 DIAGNOSIS — O26.90 PREGNANCY RELATED CONDITION, ANTEPARTUM: ICD-10-CM

## 2024-10-23 PROCEDURE — 99203 OFFICE O/P NEW LOW 30 MIN: CPT | Mod: 25 | Performed by: STUDENT IN AN ORGANIZED HEALTH CARE EDUCATION/TRAINING PROGRAM

## 2024-10-23 PROCEDURE — 76811 OB US DETAILED SNGL FETUS: CPT | Mod: 26 | Performed by: STUDENT IN AN ORGANIZED HEALTH CARE EDUCATION/TRAINING PROGRAM

## 2024-10-23 PROCEDURE — 999N000069 HC STATISTIC GENETIC COUNSELING, < 16 MIN

## 2024-10-23 PROCEDURE — 36415 COLL VENOUS BLD VENIPUNCTURE: CPT

## 2024-10-23 PROCEDURE — 99212 OFFICE O/P EST SF 10 MIN: CPT | Mod: 25 | Performed by: STUDENT IN AN ORGANIZED HEALTH CARE EDUCATION/TRAINING PROGRAM

## 2024-10-23 PROCEDURE — 76811 OB US DETAILED SNGL FETUS: CPT

## 2024-10-23 NOTE — NURSING NOTE
Pt at [facility] for ultrasound- see detailed report under imaging tab.  Pt reports positive fetal movement, denies bleeding, cramping and other concerns.  SBAR given to MD.

## 2024-10-23 NOTE — PROGRESS NOTES
[facility]  Genetic Counseling Consult    Patient:  Danielle Do YOB: 1995   Date of Service:  10/23/24      Danielle Do was seen at the [facility] for genetic consultation at the request of  due to the echogenic intracardiac focus (EIF) identified on her comprehensive ultrasound. Danielle was accompanied to today's visit by her partner.       Impression/Plan:   EIF was identified on Danielle's level II ultrasound. Danielle had a blood draw for expanded non-invasive prenatal testing (also called NIPT, NIPS, or cell-free DNA) through [facility] The expanded NIPT screens for trisomy 21, 18, and 13 and select microdeletion syndromes, including 22q11.2 deletion syndrome. The patient opted to screen for sex chromosome aneuploidies, including reported fetal sex. Results are expected in 7-14 days. The patient will be called with results and if they do not answer they requested a detailed message with results on their voicemail, including the predicted fetal sex information.  Patient was informed that results, including fetal sex, will be available in [facility].    Pregnancy History:   /Parity:    Age at Delivery: 29 year old  FRANCISCO: 3/4/2025, by Last Menstrual Period  Gestational Age: 21w1d  No significant complications or exposures were reported in the current pregnancy.    We briefly reviewed the ultrasound finding of echogenic intracardiac focus, which refers to a small bright spot in the heart.  This finding is generally not associated with heart defects or other heart problems.  Some studies have suggested that this ultrasound finding is thought to be seen with a higher frequency in pregnancies with a chromosome problem (particularly Down syndrome), as compared to pregnancies that do not have a chromosome problem. Because of this, it is thought that this ultrasound finding may increase the  risk of a chromosome problem in a pregnancy. Danielle elected to proceed with NIPT today.     Risk Assessment for Chromosome Conditions:   We explained that the risk for fetal chromosome abnormalities increases with maternal age. We discussed specific features of common chromosome abnormalities, including Down syndrome, trisomy 13, trisomy 18, and sex chromosome conditions    - At age 29 at midtrimester, the risk to have a baby with Down syndrome is 1 in 690.   - At age 29 at midtrimester, the risk to have a baby with any chromosome abnormality is 1 in 345.       An EIF can be seen in 3-5% fetus without Down syndrome. However, in the second trimester ultrasound about 21-28% of fetuses with Down syndrome have an EIF.     We discussed the following risk adjustment with seeing an EIF today:     Age-related aneuploidy risk     1 in 690 (0.1%)   Modified aneuploidy risk   1 in 383        (0.26%)     We discussed the options for more information including a cell-free DNA non-invasive screen (NIPT) or a diagnostic option such as an amniocentesis. We discussed that a screen would provide a more accurate risk assessment for this pregnancy by analyzing the cell-free DNA from the placenta in maternal blood. However, only an amniocentesis can provide diagnostic information by directly analyzing the chromosomes from fetal cells in the amniotic fluid.     Testing Options:   We discussed the following options:   First trimester screening  First trimester ultrasound with nuchal translucency and nasal bone assessments, maternal plasma hCG, PHAM-A, and AFP measurement  Screens for fetal trisomy 21, trisomy 13, and trisomy 18  Cannot screen for open neural tube defects; maternal serum AFP after 15 weeks is recommended     Genetic Amniocentesis  Invasive procedure typically performed in the second trimester by which amniotic fluid is obtained for the purpose of chromosome analysis and/or other prenatal genetic analysis  Diagnostic  results; >99% sensitivity for fetal chromosome abnormalities  AFAFP measurement tests for open neural tube defects    We reviewed the benefits and limitations of this testing.  Screening tests provide a risk assessment specific to the pregnancy for certain fetal chromosome abnormalities, but cannot definitively diagnose or exclude a fetal chromosome abnormality.  Follow-up genetic counseling and consideration of diagnostic testing is recommended with any abnormal screening result.     Diagnostic tests carry inherent risks- including risk of miscarriage- that require careful consideration.  These tests can detect fetal chromosome abnormalities with greater than 99% certainty.  Results can be compromised by maternal cell contamination or mosaicism, and are limited by the resolution of cytogenetic G-banding technology.  There is no screening nor diagnostic test that can detect all forms of birth defects or mental disability.    It was a pleasure to be involved with Danielle martinez care. Face-to-face time of the meeting was 10 minutes.      BERNICE GARCIAS MS, [facility]  Genetic Counselor  [facility]  Maternal Fetal Medicine  Office: [phone]   [facility]: [phone]   Fax: [phone]  [facility]

## 2024-10-24 NOTE — PROGRESS NOTES
The patient was seen for an ultrasound in the Maternal[facility]Fetal Medicine Center today.  For a detailed report of the ultrasound examination, please see the ultrasound report which can be found under the imaging tab.    If you have questions regarding today's evaluation or if we can be of further service, please contact the Maternal-Fetal Medicine Center.    Patrica Braga MD  , OB/GYN  Maternal-Fetal Medicine

## 2024-11-01 ENCOUNTER — TELEPHONE (OUTPATIENT)
Dept: MATERNAL FETAL MEDICINE | Facility: CLINIC | Age: 29
End: 2024-11-01
Payer: COMMERCIAL

## 2024-11-01 LAB — SCANNED LAB RESULT: NORMAL

## 2024-11-01 NOTE — TELEPHONE ENCOUNTER
November 1, 2024    I spoke with Danielle regarding her Prequel (NIPT) results through [facility].   Results indicate NO ANEUPLOIDY DETECTED for chromosomes 21, 18, 13, or sex chromosomes (XY - male predicted fetal sex). Results also indicate NO MICRODELETION DETECTED for the 22q11.2 or other select microdeletions (15q11.2, 1p36, 4p, and 5p).    This puts her current pregnancy at low risk for Down syndrome, trisomy 18, trisomy 13 and sex chromosome abnormalities. This test is reported to have the following sensitivities: Down syndrome: 99.7%, trisomy 18: 97.9%, trisomy 13: 99.0%, monosomy X: 95.8%, XX: 97.6%, and XY: 99.1%. Although these results are reassuring, this does not replace a standard chromosome analysis from a chorionic villus sampling or amniocentesis. The results also reduce, but do not eliminate, the risk for 22q11.2 deletion syndrome and other select microdeletions.    Her results are available in her Epic chart for her primary OB to review.    BERNICE GARCIAS MS, [facility]  Genetic Counselor  [facility]  Maternal Fetal Medicine  Office: [phone]   [facility]: [phone]   Fax: [phone]  [facility]

## 2024-11-14 ENCOUNTER — TRANSFERRED RECORDS (OUTPATIENT)
Dept: HEALTH INFORMATION MANAGEMENT | Facility: CLINIC | Age: 29
End: 2024-11-14
Payer: COMMERCIAL

## 2024-11-21 ENCOUNTER — OFFICE VISIT (OUTPATIENT)
Dept: MATERNAL FETAL MEDICINE | Facility: CLINIC | Age: 29
End: 2024-11-21
Attending: STUDENT IN AN ORGANIZED HEALTH CARE EDUCATION/TRAINING PROGRAM
Payer: COMMERCIAL

## 2024-11-21 ENCOUNTER — HOSPITAL ENCOUNTER (OUTPATIENT)
Dept: ULTRASOUND IMAGING | Facility: CLINIC | Age: 29
Discharge: HOME | End: 2024-11-21
Attending: STUDENT IN AN ORGANIZED HEALTH CARE EDUCATION/TRAINING PROGRAM
Payer: COMMERCIAL

## 2024-11-21 DIAGNOSIS — O43.192 PLACENTAL CYST AFFECTING PREGNANCY IN SECOND TRIMESTER: Primary | ICD-10-CM

## 2024-11-21 PROCEDURE — 76816 OB US FOLLOW-UP PER FETUS: CPT

## 2024-11-21 NOTE — PROGRESS NOTES
"Please see \"Imaging\" tab under \"Chart Review\" for details of today's visit.    Erika Kinney    "

## 2024-11-21 NOTE — NURSING NOTE
Patient reports positive fetal movement, denies pain, denies contractions, leaking of fluid, or bleeding. Education provided to patient on today's ultrasound.  SBAR given to STACY GARCIA, see their note in Epic.

## 2024-12-19 NOTE — PROGRESS NOTES
"Danielle Do is a 25 year old female who is being evaluated via a billable video visit.      The patient has been notified of following:     \"This video visit will be conducted via a call between you and your physician/provider. We have found that certain health care needs can be provided without the need for an in-person physical exam.  This service lets us provide the care you need with a video conversation.  If a prescription is necessary we can send it directly to your pharmacy.  If lab work is needed we can place an order for that and you can then stop by our lab to have the test done at a later time.    Video visits are billed at different rates depending on your insurance coverage.  Please reach out to your insurance provider with any questions.    If during the course of the call the physician/provider feels a video visit is not appropriate, you will not be charged for this service.\"    Patient has given verbal consent for Video visit? Yes  How would you like to obtain your AVS? MyChart  If you are dropped from the video visit, the video invite should be resent to: Send to e-mail at: [email or web address]  Will anyone else be joining your video visit? No    Subjective     Danielle Do is a 25 year old female who presents today via video visit for the following health issues:    HPI    Depression and Anxiety Follow-Up    How are you doing with your depression since your last visit? Improved with med but now tired again but less depressed      was exercisinmg( felt better then)    How are you doing with your anxiety since your last visit?  Improved     Are you having other symptoms that might be associated with depression or anxiety? Yes:  rubina    Have you had a significant life event? Housing Concerns     Do you have any concerns with your use of alcohol or other drugs? No    Social History     Tobacco Use     Smoking status: Never Smoker     Smokeless tobacco: Never Used   Substance Use Topics     " Subjective:   Tabatha Smalls is a 14 y.o. female who presents for Sore Throat (4 days ), Sinus Problem (4 days ), and Fever (4 days )      HPI: Patient presents today with Mom. Patient is otherwise a generally healthy teenager without chronic medical conditions, does not take daily medications, vaccinations are up to date and deny further pertinent medical history. Patient reports sore throat, congestion, cough x4 days. Fevers Tmax 101f overnight. Dad has been sick with similar symptoms. Requesting viral and strep testing.    ROS:  Constitutional: Negative for fever, reduction in appetite, weight loss  HENT: Negative for ear pulling, nosebleeds, congestion.    Eyes: Negative for ocular drainage.   Respiratory: Negative for cough, visible sputum production, SOB, wheezing.    Cardiovascular: Negative for cyanosis or syncope.   Gastrointestinal: Negative for nausea, vomiting or diarrhea. No change in bowel pattern.   Genitourinary: Negative for change in urinary pattern.  Musculoskeletal: Negative for injury, joint pain   Skin: Negative for rash.     Medications:    This patient does not have an active medication from one of the medication groupers.    Allergies: Patient has no known allergies.    Problem List: Tabatha Smalls does not have any pertinent problems on file.    Surgical History:  Past Surgical History:   Procedure Laterality Date    PB KNEE SCOPE,AID ANT CRUCIATE REPAIR Right 5/17/2024    Procedure: RIGHT KNEE ARTHROSCOPY, ANTERIOR CRUCIATE LIGAMENT REPAIR WITH IMPLANT, LATERAL EXTRA-ARTICULAR TENODESIS, LATERAL MENISCUS REPAIR AND NECESSARY AND RELATED REPAIRS;  Surgeon: Jeffrey Feng M.D.;  Location: SURGERY [facility];  Service: Orthopedics    MENISCAL REPAIR Right 5/17/2024    Procedure: REPAIR, MENISCUS, KNEE;  Surgeon: Jeffrey Feng M.D.;  Location: SURGERY [facility];  Service: Orthopedics    OTHER      Birthmark removed from head requiring 2 surgeries       Past Social Hx: Tabatha Smalls   "reports that she has never smoked. She has never used smokeless tobacco. She reports that she does not drink alcohol and does not use drugs.     Past Family Hx:  Tabatha Smalls family history includes Asthma in her maternal grandmother; Cancer in her maternal grandmother; Heart Attack in her paternal grandfather; Hypertension in her maternal grandfather.     Problem list, medications, and allergies reviewed by myself today in Epic.     Objective:     /72   Pulse 86   Temp 36.9 °C (98.4 °F) (Temporal)   Resp 19   Ht 1.62 m (5' 3.78\")   Wt 61.7 kg (136 lb)   SpO2 98%   BMI 23.51 kg/m²     Physical Exam:  General: alert, calm, comfortable  Head: normocephalic, atraumatic  Eyes: PERRLA, no conjunctival injection or drainage, no lid erythema  Ears: no tenderness, discharge. External canals are without edema or erythema. No effusion or TM inflammation.   Nose: congestion  Mouth: moist mucosa, no breaks/lesions, no erythema, exudates or tonsillar enlargement. +post nasal drainage  Lymph: no adenopathy  Neck: no masses, range of motion within normal limits, no tracheal deviation.   Neuro: neurologically appropriate for age. No sensory deficit.   Pulmonary: chest is symmetrical with respiration. No adventitious lung sounds.  Cardiovascular: RRR, pulses equal, cap refill <2 sec  GI: soft, non-tender, no guarding, no hepatosplenomegaly. BS normoactive x4 quadrants.  Musculoskeletal: no deformities or tenderness.  Skin: warm, dry, intact. No rash.    Assessment/Plan:     Diagnosis and associated orders:     1. Influenza A        2. Flu-like symptoms  POCT CEPHEID COV-2, FLU A/B, RSV - PCR      3. Pharyngitis, unspecified etiology  POCT CEPHEID COV-2, FLU A/B, RSV - PCR    POCT CEPHEID GROUP A STREP - PCR         Comments/MDM:     POCT Strep Negative  Positive for influenza - discussed results via phone with parent  Patient is outside the window of treatment  Recommended supportive care including increase fluid " Alcohol use: Yes     Comment: 2 per week      Drug use: No     PHQ 7/23/2020 8/5/2020 8/6/2020   PHQ-9 Total Score 14 10 12   Q9: Thoughts of better off dead/self-harm past 2 weeks More than half the days Several days Not at all   F/U: Thoughts of suicide or self-harm Yes Yes -   F/U: Self harm-plan Yes No -   F/U: Self-harm action No No -   F/U: Safety concerns Yes No -     ANNETTA-7 SCORE 8/5/2020   Total Score 2 (minimal anxiety)   Total Score 2     Last PHQ-9 8/6/2020   1.  Little interest or pleasure in doing things 1   2.  Feeling down, depressed, or hopeless 2   3.  Trouble falling or staying asleep, or sleeping too much 2   4.  Feeling tired or having little energy 2   5.  Poor appetite or overeating 2   6.  Feeling bad about yourself 2   7.  Trouble concentrating 0   8.  Moving slowly or restless 1   Q9: Thoughts of better off dead/self-harm past 2 weeks 0   PHQ-9 Total Score 12   Difficulty at work, home, or with people Somewhat difficult   In the past two weeks have you had thoughts of suicide or self harm? -   Do you have concerns about your personal safety or the safety of others? -   In the past 2 weeks have you thought about a plan or had intention to harm yourself? -   In the past 2 weeks have you acted on these thoughts in any way? -     ANNETTA-7  8/5/2020   1. Feeling nervous, anxious, or on edge 0   2. Not being able to stop or control worrying 0   3. Worrying too much about different things 0   4. Trouble relaxing 1   5. Being so restless that it is hard to sit still 0   6. Becoming easily annoyed or irritable 1   7. Feeling afraid, as if something awful might happen 0   ANNETTA-7 Total Score 2       Suicide Assessment Five-step Evaluation and Treatment (SAFE-T)      How many servings of fruits and vegetables do you eat daily?  2-3          Video Start Time: 832        Review of Systems   Constitutional, HEENT, cardiovascular, pulmonary, gi and gu systems are negative, except as otherwise noted.       Objective           Vitals:  No vitals were obtained today due to virtual visit.    Physical Exam     GENERAL: Healthy, alert and no distress  EYES: Eyes grossly normal to inspection.  No discharge or erythema, or obvious scleral/conjunctival abnormalities.  RESP: No audible wheeze, cough, or visible cyanosis.  No visible retractions or increased work of breathing.    SKIN: Visible skin clear. No significant rash, abnormal pigmentation or lesions.  NEURO: Cranial nerves grossly intact.  Mentation and speech appropriate for age.  PSYCH: Mentation appears normal, affect normal/bright, judgement and insight intact, normal speech and appearance well-groomed.      No results found for this or any previous visit (from the past 24 hour(s)).        Assessment & Plan     Moderate major depression (H)   better but effect waning   up dose to  - FLUoxetine (PROZAC) 20 MG capsule  Dispense: 90 capsule; Refill: 1           Regular exercise  See Patient Instructions   Follow up in 1 month.   Cruzito Edouard MD  [facility]      Video-Visit Details    Type of service:  Video Visit    Video End Time:844    Originating Location (pt. Location): Home    Distant Location (provider location):  [facility]     Platform used for Video Visit: [employer]         intake, over-the-counter medications as needed as needed for symptom management       Differential diagnosis, natural history, supportive care, and indications for immediate follow-up discussed. Advised the patient to follow-up with PCP for recheck, reevaluation, and consideration of further management.    Shanna Schulz DNP, APRN, FNP-BC

## 2024-12-23 ENCOUNTER — LAB REQUISITION (OUTPATIENT)
Dept: LAB | Facility: CLINIC | Age: 29
End: 2024-12-23

## 2024-12-23 DIAGNOSIS — Z36.89 ENCOUNTER FOR OTHER SPECIFIED ANTENATAL SCREENING: ICD-10-CM

## 2024-12-23 PROCEDURE — 86780 TREPONEMA PALLIDUM: CPT | Performed by: ADVANCED PRACTICE MIDWIFE

## 2024-12-24 LAB — T PALLIDUM AB SER QL: NONREACTIVE

## 2025-04-22 ENCOUNTER — LAB REQUISITION (OUTPATIENT)
Dept: LAB | Facility: CLINIC | Age: 30
End: 2025-04-22

## 2025-04-22 DIAGNOSIS — Z12.4 ENCOUNTER FOR SCREENING FOR MALIGNANT NEOPLASM OF CERVIX: ICD-10-CM

## 2025-04-22 LAB
HPV HR 12 DNA CVX QL NAA+PROBE: NEGATIVE
HPV16 DNA CVX QL NAA+PROBE: NEGATIVE
HPV18 DNA CVX QL NAA+PROBE: NEGATIVE
HUMAN PAPILLOMA VIRUS FINAL DIAGNOSIS: NORMAL

## 2025-04-22 PROCEDURE — 87624 HPV HI-RISK TYP POOLED RSLT: CPT | Performed by: ADVANCED PRACTICE MIDWIFE

## 2025-06-14 ENCOUNTER — HEALTH MAINTENANCE LETTER (OUTPATIENT)
Age: 30
End: 2025-06-14

## 2025-07-28 ENCOUNTER — OFFICE VISIT (OUTPATIENT)
Dept: FAMILY MEDICINE | Facility: CLINIC | Age: 30
End: 2025-07-28
Payer: COMMERCIAL

## 2025-07-28 VITALS
OXYGEN SATURATION: 96 % | RESPIRATION RATE: 18 BRPM | DIASTOLIC BLOOD PRESSURE: 74 MMHG | SYSTOLIC BLOOD PRESSURE: 116 MMHG | HEART RATE: 79 BPM | WEIGHT: 239 LBS | BODY MASS INDEX: 38.29 KG/M2 | TEMPERATURE: 97.1 F

## 2025-07-28 DIAGNOSIS — K64.4 EXTERNAL HEMORRHOIDS: Primary | ICD-10-CM

## 2025-07-28 PROCEDURE — 3078F DIAST BP <80 MM HG: CPT | Performed by: PHYSICIAN ASSISTANT

## 2025-07-28 PROCEDURE — 1125F AMNT PAIN NOTED PAIN PRSNT: CPT | Performed by: PHYSICIAN ASSISTANT

## 2025-07-28 PROCEDURE — 99203 OFFICE O/P NEW LOW 30 MIN: CPT | Performed by: PHYSICIAN ASSISTANT

## 2025-07-28 PROCEDURE — 3074F SYST BP LT 130 MM HG: CPT | Performed by: PHYSICIAN ASSISTANT

## 2025-07-28 RX ORDER — HYDROCORTISONE 25 MG/G
CREAM TOPICAL 2 TIMES DAILY PRN
Qty: 30 G | Refills: 1 | Status: SHIPPED | OUTPATIENT
Start: 2025-07-28

## 2025-07-28 ASSESSMENT — PAIN SCALES - GENERAL: PAINLEVEL_OUTOF10: MODERATE PAIN (6)

## 2025-07-28 ASSESSMENT — PATIENT HEALTH QUESTIONNAIRE - PHQ9
10. IF YOU CHECKED OFF ANY PROBLEMS, HOW DIFFICULT HAVE THESE PROBLEMS MADE IT FOR YOU TO DO YOUR WORK, TAKE CARE OF THINGS AT HOME, OR GET ALONG WITH OTHER PEOPLE: NOT DIFFICULT AT ALL
SUM OF ALL RESPONSES TO PHQ QUESTIONS 1-9: 2
SUM OF ALL RESPONSES TO PHQ QUESTIONS 1-9: 2

## 2025-07-28 NOTE — PROGRESS NOTES
Assessment & Plan     External hemorrhoids    I discussed the etiology of hemorrhoids and the exacerbating factors including constipation, prolonged sitting, straining etc.  Discussed surgical treatment options. Encouraged high fiber diet, fluids and we discussed local cares, such as sitz baths.  See [facility] for orders.  Call if increasing bleeding, pain or other concerns.      - psyllium (METAMUCIL/KONSYL) capsule; Take 1-2 capsules by mouth daily.  - hydrocortisone, Perianal, (HYDROCORTISONE) 2.5 % cream; Place rectally 2 times daily as needed for hemorrhoids.  - Adult Colorectal Surgery  Referral; Future        Subjective   Danielle is a 30 year old, presenting for the following health issues:  Rectal Problem        7/28/2025     1:19 PM   Additional Questions   Roomed by Daiana   Accompanied by Self         7/28/2025     1:19 PM   Patient Reported Additional Medications   Patient reports taking the following new medications Miralax and Senokot, preparation h Spray and Lidacaine Spray     HPI            Hemorrhoids  Onset/Duration: After pt gave birth in March, 2025    Has had 3 flare-ups since giving birth.    Description:   Xena-anal lump: YES- pt believes she has felt Hemorrhoids  Pain: YES - Pain is not present during BM, will usually occur a few hours after passing BM   Itching: No  Accompanying Signs & Symptoms:  Blood in stool: YES  Changes in stool pattern: YES - Softer stool yesterday, last week stools were regular   History:   Any previous GI studies done:none  Family History of colon cancer: No  Precipitating factors:   None  Alleviating factors:  None  Therapies tried and outcome: Miralax and Senokot with help for constipation, preparation h Spray and Lidacaine Spray for Rectal pain with some relief   Witch hazel wipes.     Stools currently are soft.    Mild abdominal pain.              Review of Systems  Constitutional, HEENT, cardiovascular, pulmonary, gi and gu systems are negative, except  as otherwise noted.      Objective    /74 (BP Location: Left arm, Patient Position: Chair, Cuff Size: Adult Large)   Pulse 79   Temp 97.1  F (36.2  C) (Temporal)   Resp 18   Wt 108.4 kg (239 lb)   SpO2 96%   Breastfeeding Yes   BMI 38.29 kg/m    Body mass index is 38.29 kg/m .  Physical Exam   GENERAL: alert and no distress  RECTAL (female): external, non-thrombosed hemorrhoid noted.  No bleeding currently.             Signed Electronically by: Melissa Chong PA-C  {Email feedback regarding this note to [email or web address]   :[id number]}

## 2025-07-29 ENCOUNTER — PATIENT OUTREACH (OUTPATIENT)
Dept: CARE COORDINATION | Facility: CLINIC | Age: 30
End: 2025-07-29
Payer: COMMERCIAL

## 2025-07-31 ENCOUNTER — PATIENT OUTREACH (OUTPATIENT)
Dept: CARE COORDINATION | Facility: CLINIC | Age: 30
End: 2025-07-31
Payer: COMMERCIAL